# Patient Record
Sex: FEMALE | Race: WHITE | NOT HISPANIC OR LATINO | URBAN - METROPOLITAN AREA
[De-identification: names, ages, dates, MRNs, and addresses within clinical notes are randomized per-mention and may not be internally consistent; named-entity substitution may affect disease eponyms.]

---

## 2022-01-27 ENCOUNTER — INPATIENT (INPATIENT)
Facility: HOSPITAL | Age: 87
LOS: 4 days | Discharge: NURSING, REHAB OR LONG-TERM CARE | DRG: 563 | End: 2022-02-01
Attending: INTERNAL MEDICINE | Admitting: INTERNAL MEDICINE
Payer: COMMERCIAL

## 2022-01-27 VITALS
TEMPERATURE: 99 F | SYSTOLIC BLOOD PRESSURE: 107 MMHG | RESPIRATION RATE: 16 BRPM | WEIGHT: 179.9 LBS | HEART RATE: 84 BPM | DIASTOLIC BLOOD PRESSURE: 69 MMHG | OXYGEN SATURATION: 87 % | HEIGHT: 63 IN

## 2022-01-27 DIAGNOSIS — S42.302A UNSPECIFIED FRACTURE OF SHAFT OF HUMERUS, LEFT ARM, INITIAL ENCOUNTER FOR CLOSED FRACTURE: ICD-10-CM

## 2022-01-27 DIAGNOSIS — J44.9 CHRONIC OBSTRUCTIVE PULMONARY DISEASE, UNSPECIFIED: ICD-10-CM

## 2022-01-27 DIAGNOSIS — S42.309A UNSPECIFIED FRACTURE OF SHAFT OF HUMERUS, UNSPECIFIED ARM, INITIAL ENCOUNTER FOR CLOSED FRACTURE: ICD-10-CM

## 2022-01-27 DIAGNOSIS — Z29.9 ENCOUNTER FOR PROPHYLACTIC MEASURES, UNSPECIFIED: ICD-10-CM

## 2022-01-27 DIAGNOSIS — I10 ESSENTIAL (PRIMARY) HYPERTENSION: ICD-10-CM

## 2022-01-27 LAB
ALBUMIN SERPL ELPH-MCNC: 2.6 G/DL — LOW (ref 3.5–5)
ALP SERPL-CCNC: 148 U/L — HIGH (ref 40–120)
ALT FLD-CCNC: 25 U/L DA — SIGNIFICANT CHANGE UP (ref 10–60)
ANION GAP SERPL CALC-SCNC: 7 MMOL/L — SIGNIFICANT CHANGE UP (ref 5–17)
AST SERPL-CCNC: 45 U/L — HIGH (ref 10–40)
BASOPHILS # BLD AUTO: 0.05 K/UL — SIGNIFICANT CHANGE UP (ref 0–0.2)
BASOPHILS NFR BLD AUTO: 0.5 % — SIGNIFICANT CHANGE UP (ref 0–2)
BILIRUB SERPL-MCNC: 1.3 MG/DL — HIGH (ref 0.2–1.2)
BUN SERPL-MCNC: 35 MG/DL — HIGH (ref 7–18)
CALCIUM SERPL-MCNC: 8.1 MG/DL — LOW (ref 8.4–10.5)
CHLORIDE SERPL-SCNC: 102 MMOL/L — SIGNIFICANT CHANGE UP (ref 96–108)
CK SERPL-CCNC: 127 U/L — SIGNIFICANT CHANGE UP (ref 21–215)
CO2 SERPL-SCNC: 25 MMOL/L — SIGNIFICANT CHANGE UP (ref 22–31)
CREAT SERPL-MCNC: 0.82 MG/DL — SIGNIFICANT CHANGE UP (ref 0.5–1.3)
EOSINOPHIL # BLD AUTO: 0.15 K/UL — SIGNIFICANT CHANGE UP (ref 0–0.5)
EOSINOPHIL NFR BLD AUTO: 1.6 % — SIGNIFICANT CHANGE UP (ref 0–6)
GLUCOSE SERPL-MCNC: 110 MG/DL — HIGH (ref 70–99)
HCT VFR BLD CALC: 25.9 % — LOW (ref 34.5–45)
HGB BLD-MCNC: 8.2 G/DL — LOW (ref 11.5–15.5)
IMM GRANULOCYTES NFR BLD AUTO: 0.8 % — SIGNIFICANT CHANGE UP (ref 0–1.5)
LYMPHOCYTES # BLD AUTO: 1.39 K/UL — SIGNIFICANT CHANGE UP (ref 1–3.3)
LYMPHOCYTES # BLD AUTO: 15 % — SIGNIFICANT CHANGE UP (ref 13–44)
MCHC RBC-ENTMCNC: 31.4 PG — SIGNIFICANT CHANGE UP (ref 27–34)
MCHC RBC-ENTMCNC: 31.7 GM/DL — LOW (ref 32–36)
MCV RBC AUTO: 99.2 FL — SIGNIFICANT CHANGE UP (ref 80–100)
MONOCYTES # BLD AUTO: 1.16 K/UL — HIGH (ref 0–0.9)
MONOCYTES NFR BLD AUTO: 12.5 % — SIGNIFICANT CHANGE UP (ref 2–14)
NEUTROPHILS # BLD AUTO: 6.44 K/UL — SIGNIFICANT CHANGE UP (ref 1.8–7.4)
NEUTROPHILS NFR BLD AUTO: 69.6 % — SIGNIFICANT CHANGE UP (ref 43–77)
NRBC # BLD: 0 /100 WBCS — SIGNIFICANT CHANGE UP (ref 0–0)
PLATELET # BLD AUTO: 453 K/UL — HIGH (ref 150–400)
POTASSIUM SERPL-MCNC: 4.1 MMOL/L — SIGNIFICANT CHANGE UP (ref 3.5–5.3)
POTASSIUM SERPL-SCNC: 4.1 MMOL/L — SIGNIFICANT CHANGE UP (ref 3.5–5.3)
PROT SERPL-MCNC: 6.5 G/DL — SIGNIFICANT CHANGE UP (ref 6–8.3)
RBC # BLD: 2.61 M/UL — LOW (ref 3.8–5.2)
RBC # FLD: 15.9 % — HIGH (ref 10.3–14.5)
SARS-COV-2 RNA SPEC QL NAA+PROBE: SIGNIFICANT CHANGE UP
SODIUM SERPL-SCNC: 134 MMOL/L — LOW (ref 135–145)
WBC # BLD: 9.26 K/UL — SIGNIFICANT CHANGE UP (ref 3.8–10.5)
WBC # FLD AUTO: 9.26 K/UL — SIGNIFICANT CHANGE UP (ref 3.8–10.5)

## 2022-01-27 PROCEDURE — 73110 X-RAY EXAM OF WRIST: CPT | Mod: 26,LT

## 2022-01-27 PROCEDURE — 73060 X-RAY EXAM OF HUMERUS: CPT | Mod: 26,LT

## 2022-01-27 PROCEDURE — 99284 EMERGENCY DEPT VISIT MOD MDM: CPT

## 2022-01-27 RX ORDER — FERROUS SULFATE 325(65) MG
1 TABLET ORAL
Qty: 0 | Refills: 0 | DISCHARGE

## 2022-01-27 RX ORDER — LOSARTAN POTASSIUM 100 MG/1
50 TABLET, FILM COATED ORAL DAILY
Refills: 0 | Status: DISCONTINUED | OUTPATIENT
Start: 2022-01-27 | End: 2022-01-28

## 2022-01-27 RX ORDER — KETOROLAC TROMETHAMINE 30 MG/ML
30 SYRINGE (ML) INJECTION EVERY 8 HOURS
Refills: 0 | Status: DISCONTINUED | OUTPATIENT
Start: 2022-01-27 | End: 2022-01-28

## 2022-01-27 RX ORDER — LIDOCAINE 4 G/100G
1 CREAM TOPICAL DAILY
Refills: 0 | Status: DISCONTINUED | OUTPATIENT
Start: 2022-01-27 | End: 2022-02-01

## 2022-01-27 RX ORDER — PANTOPRAZOLE SODIUM 20 MG/1
40 TABLET, DELAYED RELEASE ORAL
Refills: 0 | Status: DISCONTINUED | OUTPATIENT
Start: 2022-01-27 | End: 2022-02-01

## 2022-01-27 RX ORDER — ENOXAPARIN SODIUM 100 MG/ML
40 INJECTION SUBCUTANEOUS DAILY
Refills: 0 | Status: DISCONTINUED | OUTPATIENT
Start: 2022-01-27 | End: 2022-01-28

## 2022-01-27 RX ORDER — ACETAMINOPHEN 500 MG
1 TABLET ORAL
Qty: 0 | Refills: 0 | DISCHARGE

## 2022-01-27 RX ORDER — TAMSULOSIN HYDROCHLORIDE 0.4 MG/1
0.4 CAPSULE ORAL AT BEDTIME
Refills: 0 | Status: DISCONTINUED | OUTPATIENT
Start: 2022-01-27 | End: 2022-02-01

## 2022-01-27 RX ORDER — OXYCODONE HYDROCHLORIDE 5 MG/1
1 TABLET ORAL
Qty: 0 | Refills: 0 | DISCHARGE

## 2022-01-27 RX ORDER — ACETAMINOPHEN 500 MG
650 TABLET ORAL EVERY 6 HOURS
Refills: 0 | Status: DISCONTINUED | OUTPATIENT
Start: 2022-01-27 | End: 2022-01-28

## 2022-01-27 RX ORDER — SODIUM CHLORIDE 9 MG/ML
1000 INJECTION INTRAMUSCULAR; INTRAVENOUS; SUBCUTANEOUS
Refills: 0 | Status: DISCONTINUED | OUTPATIENT
Start: 2022-01-27 | End: 2022-02-01

## 2022-01-27 RX ORDER — MORPHINE SULFATE 50 MG/1
4 CAPSULE, EXTENDED RELEASE ORAL ONCE
Refills: 0 | Status: DISCONTINUED | OUTPATIENT
Start: 2022-01-27 | End: 2022-01-27

## 2022-01-27 RX ORDER — SENNA PLUS 8.6 MG/1
2 TABLET ORAL AT BEDTIME
Refills: 0 | Status: DISCONTINUED | OUTPATIENT
Start: 2022-01-27 | End: 2022-02-01

## 2022-01-27 RX ORDER — ENOXAPARIN SODIUM 100 MG/ML
0 INJECTION SUBCUTANEOUS
Qty: 0 | Refills: 0 | DISCHARGE

## 2022-01-27 RX ORDER — GABAPENTIN 400 MG/1
100 CAPSULE ORAL THREE TIMES A DAY
Refills: 0 | Status: DISCONTINUED | OUTPATIENT
Start: 2022-01-27 | End: 2022-01-31

## 2022-01-27 RX ORDER — OXYCODONE HYDROCHLORIDE 5 MG/1
5 TABLET ORAL EVERY 6 HOURS
Refills: 0 | Status: DISCONTINUED | OUTPATIENT
Start: 2022-01-27 | End: 2022-01-29

## 2022-01-27 RX ADMIN — MORPHINE SULFATE 4 MILLIGRAM(S): 50 CAPSULE, EXTENDED RELEASE ORAL at 16:10

## 2022-01-27 RX ADMIN — MORPHINE SULFATE 4 MILLIGRAM(S): 50 CAPSULE, EXTENDED RELEASE ORAL at 19:00

## 2022-01-27 RX ADMIN — Medication 30 MILLIGRAM(S): at 20:22

## 2022-01-27 RX ADMIN — SODIUM CHLORIDE 75 MILLILITER(S): 9 INJECTION INTRAMUSCULAR; INTRAVENOUS; SUBCUTANEOUS at 20:16

## 2022-01-27 RX ADMIN — Medication 30 MILLIGRAM(S): at 20:00

## 2022-01-27 RX ADMIN — MORPHINE SULFATE 4 MILLIGRAM(S): 50 CAPSULE, EXTENDED RELEASE ORAL at 18:42

## 2022-01-27 RX ADMIN — MORPHINE SULFATE 4 MILLIGRAM(S): 50 CAPSULE, EXTENDED RELEASE ORAL at 18:56

## 2022-01-27 NOTE — H&P ADULT - PROBLEM SELECTOR PLAN 2
pmh of htn, bp soft in ED  will continue losartan at half dose (50), holding hctz  monitor bp  adjust as needed

## 2022-01-27 NOTE — ED ADULT NURSE REASSESSMENT NOTE - NS ED NURSE REASSESS COMMENT FT1
Patient is alert and oriented x 4. Resting in bed. Pain to left arm; pain score 4/10. Patient awaiting bed. Safety maintained.

## 2022-01-27 NOTE — ED ADULT NURSE NOTE - NSICDXPASTMEDICALHX_GEN_ALL_CORE_FT
PAST MEDICAL HISTORY:  Anxiety     Arthritis     COPD (chronic obstructive pulmonary disease) on home O2    Hypertension

## 2022-01-27 NOTE — ED PROVIDER NOTE - PROGRESS NOTE DETAILS
Chapman: pain tolerable when not moved. xr shows fracture at multi segment, displaced, significant swelling. pt vascularly intact, compartment remains soft but still can't move digits. ortho rec conservative management  admit to med for pain controll

## 2022-01-27 NOTE — ED PROVIDER NOTE - OBJECTIVE STATEMENT
89 yr old female with hx of COPD, left shaft humeral fx on simpson splint, HTN, and left ORIF presents to ed fro, QBEC presents to ed c/o severe worsening pain of left humerus. pt was dc from [facility] for a left humeral fx. prior recod per ortho shows able to move digit.

## 2022-01-27 NOTE — H&P ADULT - ASSESSMENT
Patient is a 89 y/o F with PMH of HTN, CODP, who presented from NH due to worsening pain and difficulty moving fingers of left arm. Admitted for possible hematoma, worsening fracture, ortho eval.

## 2022-01-27 NOTE — ED ADULT NURSE NOTE - CHIEF COMPLAINT QUOTE
BIBA from [facility] extended care, with left arm swelling s/p surgery of l humerus 4 days ago

## 2022-01-27 NOTE — ED PROVIDER NOTE - CLINICAL SUMMARY MEDICAL DECISION MAKING FREE TEXT BOX
89 yr old female with hx of COPD, left shaft humeral fx on simpson splint, HTN, and left ORIF presents to ed fro, QBEC presents to ed c/o severe worsening pain of left humerus. pt was dc from [facility] for a left humeral fx. prior record per ortho shows able to move digit.    will contact ortho and obtain xr stat. concern for brachial plexus injury, compartment are soft, doubtful that this is embolism induced pain. labs, morphine, admit maintain simpson splint/brace

## 2022-01-27 NOTE — H&P ADULT - NSHPPHYSICALEXAM_GEN_ALL_CORE
GENERAL: distress due to pain   HEAD:  Atraumatic, Normocephalic  EYES: EOMI, PERRLA, conjunctiva and sclera clear  ENMT: No tonsillar erythema, exudates, or enlargement; Moist mucous membranes, Good dentition, No lesions  NECK: Supple, normal appearance, No JVD; Normal thyroid; Trachea midline  NERVOUS SYSTEM:  Alert & Oriented X3,  Motor Strength 5/5 B/L lower extremities, motor 5/5 on upper right arm, 2/5 on left arm due to pain, and fracture. sensation intact  CHEST/LUNG: Lungs clear to auscultation bilaterally, No rales, rhonchi, wheezing   HEART: Regular rate and rhythm; No murmurs, rubs, or gallops  ABDOMEN: Soft, Nontender, Nondistended; Bowel sounds present  EXTREMITIES:  2+ Peripheral Pulses, +2 edema on left arm up to wrist, there is tenderness to palpation of upper L arm, there is deformation at the wrist joint, with tenderness to palpation, pt able to move wrist joint and fingers w/o difficulty  LYMPH: No lymphadenopathy noted  SKIN: No rashes or lesions;  Good capillary refill

## 2022-01-27 NOTE — H&P ADULT - NSHPREVIEWOFSYSTEMS_GEN_ALL_CORE
CONSTITUTIONAL: No fever, weight loss, or fatigue  EYES: No eye pain, visual disturbances, or discharge  ENT:  No difficulty hearing, tinnitus, vertigo; No sinus or throat pain  NECK: No pain or stiffness  RESPIRATORY: No cough, wheezing, chills or hemoptysis; No Shortness of Breath  CARDIOVASCULAR: No chest pain, palpitations, passing out, dizziness, or leg swelling  GASTROINTESTINAL: No abdominal or epigastric pain. No nausea, vomiting, or hematemesis; No diarrhea or constipation. No melena or hematochezia.  GENITOURINARY: No dysuria, frequency, hematuria, or incontinence  NEUROLOGICAL: No headaches, memory loss, loss of strength, numbness, or tremors  SKIN: No itching, burning, rashes, or lesions   LYMPH Nodes: No enlarged glands  ENDOCRINE: No heat or cold intolerance; No hair loss  MUSCULOSKELETAL: Severe pain at left arm, with edema and difficulty with movement.   PSYCHIATRIC: No depression, anxiety, mood swings, or difficulty sleeping  HEME/LYMPH: No easy bruising, or bleeding gums  ALLERGY AND IMMUNOLOGIC: No hives or eczema

## 2022-01-27 NOTE — H&P ADULT - HISTORY OF PRESENT ILLNESS
Patient is a 89 y/o F with PMH of HTN, CODP, who presented from NH due to worsening pain and difficulty moving fingers of left arm. Patient had a fall 2 weeks ago where she sustained a fracture of the left humerus. She did not receive any surgical interventions at that time, she was treated conservatively in the hospital for 2 weeks and was discharge don 1/24/22 to NH. At the nursing home patient developed worsening pain and was having difficulty moving her fingers. She was evaluated by a vascular NP who recommended admission to hospital for further eval. Patient complains of severe pain, specially when touching or moving. On my exam, vasculature, motor and sensation was intact at the distal aspect. She is unable to move at the elbow joint, or shoulder joint due to severe pain. The left arm is swollen and edematous.     Xrays in ED showing Left humeral fracture in 3 pieces, left wrist xray with possible ulnar dislocation?, Official reads pending. Ortho evaluated patient in ed, recommended, outpatient follow up.

## 2022-01-27 NOTE — H&P ADULT - PROBLEM SELECTOR PLAN 1
pt s/p fall 2 weeks ago with left humeral fracture, no surgery done at [facility]  Pt now with worsening pain, swelling, and difficulty moving fingers at NH  sensation and finger movement intact on exam, vasculature intact  worsening edema and pain, with drop in Hgb concern for developing hematoma  f/u oficial xray reads  f/u CT with IV contrast  pain meds prn  Ortho consulted  Pain management consulted

## 2022-01-27 NOTE — ED ADULT NURSE NOTE - NSIMPLEMENTINTERV_GEN_ALL_ED
Implemented All Fall with Harm Risk Interventions:  [place] to call system. Call bell, personal items and telephone within reach. Instruct patient to call for assistance. Room bathroom lighting operational. Non-slip footwear when patient is off stretcher. Physically safe environment: no spills, clutter or unnecessary equipment. Stretcher in lowest position, wheels locked, appropriate side rails in place. Provide visual cue, wrist band, yellow gown, etc. Monitor gait and stability. Monitor for mental status changes and reorient to person, place, and time. Review medications for side effects contributing to fall risk. Reinforce activity limits and safety measures with patient and family. Provide visual clues: red socks.

## 2022-01-27 NOTE — ED PROVIDER NOTE - MUSCULOSKELETAL, MLM
LUE- severe swelling and hematoma of left humerus with soft compartments. flaccid extremity, radial pulse intact. unable to move digit.

## 2022-01-27 NOTE — ED ADULT TRIAGE NOTE - MEANS OF ARRIVAL
Telephone Encounter by Shweta Wright RN at 10/26/18 10:14 AM     Author:  Shweta Wright RN Service:  (none) Author Type:  Registered Nurse     Filed:  10/26/18 10:15 AM Encounter Date:  10/26/2018 Status:  Signed     :  Shweta Wright RN (Registered Nurse)            Labs released to [employer][LM1.1M]    Patient notified via [employer] message.[LM1.1T]        Revision History        User Key Date/Time User Provider Type Action    > LM1.1 10/26/18 10:15 AM Shweta Wright RN Registered Nurse Sign    M - Manual, T - Template             stretcher

## 2022-01-27 NOTE — ED ADULT TRIAGE NOTE - CHIEF COMPLAINT QUOTE
BIBA from [facility] extended care, with left arm swelling s/p surgery of l humerus 4 days ago

## 2022-01-27 NOTE — ED PROVIDER NOTE - CONSTITUTIONAL, MLM
normal... Well appearing, awake, alert, oriented to person, place, time/situation and in moderate apparent distress.

## 2022-01-28 DIAGNOSIS — Z02.9 ENCOUNTER FOR ADMINISTRATIVE EXAMINATIONS, UNSPECIFIED: ICD-10-CM

## 2022-01-28 DIAGNOSIS — D64.9 ANEMIA, UNSPECIFIED: ICD-10-CM

## 2022-01-28 DIAGNOSIS — Z71.89 OTHER SPECIFIED COUNSELING: ICD-10-CM

## 2022-01-28 LAB
ABO RH CONFIRMATION: SIGNIFICANT CHANGE UP
ALBUMIN SERPL ELPH-MCNC: 2 G/DL — LOW (ref 3.5–5)
ALP SERPL-CCNC: 120 U/L — SIGNIFICANT CHANGE UP (ref 40–120)
ALT FLD-CCNC: 20 U/L DA — SIGNIFICANT CHANGE UP (ref 10–60)
ANION GAP SERPL CALC-SCNC: 5 MMOL/L — SIGNIFICANT CHANGE UP (ref 5–17)
ANISOCYTOSIS BLD QL: SLIGHT — SIGNIFICANT CHANGE UP
AST SERPL-CCNC: 30 U/L — SIGNIFICANT CHANGE UP (ref 10–40)
BASOPHILS # BLD AUTO: 0.05 K/UL — SIGNIFICANT CHANGE UP (ref 0–0.2)
BASOPHILS NFR BLD AUTO: 0.6 % — SIGNIFICANT CHANGE UP (ref 0–2)
BILIRUB SERPL-MCNC: 1.2 MG/DL — SIGNIFICANT CHANGE UP (ref 0.2–1.2)
BLD GP AB SCN SERPL QL: SIGNIFICANT CHANGE UP
BUN SERPL-MCNC: 28 MG/DL — HIGH (ref 7–18)
CALCIUM SERPL-MCNC: 8.1 MG/DL — LOW (ref 8.4–10.5)
CHLORIDE SERPL-SCNC: 107 MMOL/L — SIGNIFICANT CHANGE UP (ref 96–108)
CO2 SERPL-SCNC: 25 MMOL/L — SIGNIFICANT CHANGE UP (ref 22–31)
CREAT SERPL-MCNC: 0.67 MG/DL — SIGNIFICANT CHANGE UP (ref 0.5–1.3)
EOSINOPHIL # BLD AUTO: 0.18 K/UL — SIGNIFICANT CHANGE UP (ref 0–0.5)
EOSINOPHIL NFR BLD AUTO: 2 % — SIGNIFICANT CHANGE UP (ref 0–6)
GLUCOSE SERPL-MCNC: 102 MG/DL — HIGH (ref 70–99)
HCT VFR BLD CALC: 20.7 % — CRITICAL LOW (ref 34.5–45)
HCT VFR BLD CALC: 24.2 % — LOW (ref 34.5–45)
HGB BLD-MCNC: 6.7 G/DL — CRITICAL LOW (ref 11.5–15.5)
HGB BLD-MCNC: 7.6 G/DL — LOW (ref 11.5–15.5)
HYPOCHROMIA BLD QL: SLIGHT — SIGNIFICANT CHANGE UP
IMM GRANULOCYTES NFR BLD AUTO: 0.6 % — SIGNIFICANT CHANGE UP (ref 0–1.5)
LYMPHOCYTES # BLD AUTO: 1.72 K/UL — SIGNIFICANT CHANGE UP (ref 1–3.3)
LYMPHOCYTES # BLD AUTO: 19.4 % — SIGNIFICANT CHANGE UP (ref 13–44)
MACROCYTES BLD QL: SLIGHT — SIGNIFICANT CHANGE UP
MAGNESIUM SERPL-MCNC: 2 MG/DL — SIGNIFICANT CHANGE UP (ref 1.6–2.6)
MANUAL SMEAR VERIFICATION: SIGNIFICANT CHANGE UP
MCHC RBC-ENTMCNC: 31.4 GM/DL — LOW (ref 32–36)
MCHC RBC-ENTMCNC: 31.7 PG — SIGNIFICANT CHANGE UP (ref 27–34)
MCHC RBC-ENTMCNC: 32.4 GM/DL — SIGNIFICANT CHANGE UP (ref 32–36)
MCHC RBC-ENTMCNC: 32.4 PG — SIGNIFICANT CHANGE UP (ref 27–34)
MCV RBC AUTO: 100 FL — SIGNIFICANT CHANGE UP (ref 80–100)
MCV RBC AUTO: 100.8 FL — HIGH (ref 80–100)
MONOCYTES # BLD AUTO: 0.93 K/UL — HIGH (ref 0–0.9)
MONOCYTES NFR BLD AUTO: 10.5 % — SIGNIFICANT CHANGE UP (ref 2–14)
NEUTROPHILS # BLD AUTO: 5.92 K/UL — SIGNIFICANT CHANGE UP (ref 1.8–7.4)
NEUTROPHILS NFR BLD AUTO: 66.9 % — SIGNIFICANT CHANGE UP (ref 43–77)
NRBC # BLD: 0 /100 WBCS — SIGNIFICANT CHANGE UP (ref 0–0)
NRBC # BLD: 0 /100 WBCS — SIGNIFICANT CHANGE UP (ref 0–0)
PHOSPHATE SERPL-MCNC: 3.1 MG/DL — SIGNIFICANT CHANGE UP (ref 2.5–4.5)
PLAT MORPH BLD: NORMAL — SIGNIFICANT CHANGE UP
PLATELET # BLD AUTO: 358 K/UL — SIGNIFICANT CHANGE UP (ref 150–400)
PLATELET # BLD AUTO: 419 K/UL — HIGH (ref 150–400)
POIKILOCYTOSIS BLD QL AUTO: SLIGHT — SIGNIFICANT CHANGE UP
POLYCHROMASIA BLD QL SMEAR: SLIGHT — SIGNIFICANT CHANGE UP
POTASSIUM SERPL-MCNC: 3.7 MMOL/L — SIGNIFICANT CHANGE UP (ref 3.5–5.3)
POTASSIUM SERPL-SCNC: 3.7 MMOL/L — SIGNIFICANT CHANGE UP (ref 3.5–5.3)
PROT SERPL-MCNC: 5.5 G/DL — LOW (ref 6–8.3)
RBC # BLD: 2.07 M/UL — LOW (ref 3.8–5.2)
RBC # BLD: 2.4 M/UL — LOW (ref 3.8–5.2)
RBC # FLD: 15.7 % — HIGH (ref 10.3–14.5)
RBC # FLD: 15.9 % — HIGH (ref 10.3–14.5)
RBC BLD AUTO: ABNORMAL
SMUDGE CELLS # BLD: PRESENT — SIGNIFICANT CHANGE UP
SODIUM SERPL-SCNC: 137 MMOL/L — SIGNIFICANT CHANGE UP (ref 135–145)
WBC # BLD: 10.47 K/UL — SIGNIFICANT CHANGE UP (ref 3.8–10.5)
WBC # BLD: 8.85 K/UL — SIGNIFICANT CHANGE UP (ref 3.8–10.5)
WBC # FLD AUTO: 10.47 K/UL — SIGNIFICANT CHANGE UP (ref 3.8–10.5)
WBC # FLD AUTO: 8.85 K/UL — SIGNIFICANT CHANGE UP (ref 3.8–10.5)

## 2022-01-28 PROCEDURE — 73201 CT UPPER EXTREMITY W/DYE: CPT | Mod: 26,LT

## 2022-01-28 PROCEDURE — 99222 1ST HOSP IP/OBS MODERATE 55: CPT

## 2022-01-28 RX ORDER — ALBUTEROL 90 UG/1
2 AEROSOL, METERED ORAL EVERY 6 HOURS
Refills: 0 | Status: DISCONTINUED | OUTPATIENT
Start: 2022-01-28 | End: 2022-02-01

## 2022-01-28 RX ORDER — POLYETHYLENE GLYCOL 3350 17 G/17G
17 POWDER, FOR SOLUTION ORAL DAILY
Refills: 0 | Status: DISCONTINUED | OUTPATIENT
Start: 2022-01-28 | End: 2022-02-01

## 2022-01-28 RX ORDER — ACETAMINOPHEN 500 MG
1000 TABLET ORAL ONCE
Refills: 0 | Status: COMPLETED | OUTPATIENT
Start: 2022-01-29 | End: 2022-01-29

## 2022-01-28 RX ORDER — ACETAMINOPHEN 500 MG
1000 TABLET ORAL ONCE
Refills: 0 | Status: COMPLETED | OUTPATIENT
Start: 2022-01-28 | End: 2022-01-28

## 2022-01-28 RX ADMIN — TAMSULOSIN HYDROCHLORIDE 0.4 MILLIGRAM(S): 0.4 CAPSULE ORAL at 21:25

## 2022-01-28 RX ADMIN — Medication 1000 MILLIGRAM(S): at 21:30

## 2022-01-28 RX ADMIN — GABAPENTIN 100 MILLIGRAM(S): 400 CAPSULE ORAL at 06:24

## 2022-01-28 RX ADMIN — GABAPENTIN 100 MILLIGRAM(S): 400 CAPSULE ORAL at 00:46

## 2022-01-28 RX ADMIN — OXYCODONE HYDROCHLORIDE 5 MILLIGRAM(S): 5 TABLET ORAL at 17:27

## 2022-01-28 RX ADMIN — GABAPENTIN 100 MILLIGRAM(S): 400 CAPSULE ORAL at 21:27

## 2022-01-28 RX ADMIN — Medication 400 MILLIGRAM(S): at 11:29

## 2022-01-28 RX ADMIN — OXYCODONE HYDROCHLORIDE 5 MILLIGRAM(S): 5 TABLET ORAL at 09:40

## 2022-01-28 RX ADMIN — SENNA PLUS 2 TABLET(S): 8.6 TABLET ORAL at 21:25

## 2022-01-28 RX ADMIN — LIDOCAINE 1 PATCH: 4 CREAM TOPICAL at 19:40

## 2022-01-28 RX ADMIN — PANTOPRAZOLE SODIUM 40 MILLIGRAM(S): 20 TABLET, DELAYED RELEASE ORAL at 06:24

## 2022-01-28 RX ADMIN — ALBUTEROL 2 PUFF(S): 90 AEROSOL, METERED ORAL at 11:43

## 2022-01-28 RX ADMIN — SENNA PLUS 2 TABLET(S): 8.6 TABLET ORAL at 00:46

## 2022-01-28 RX ADMIN — GABAPENTIN 100 MILLIGRAM(S): 400 CAPSULE ORAL at 15:56

## 2022-01-28 RX ADMIN — TAMSULOSIN HYDROCHLORIDE 0.4 MILLIGRAM(S): 0.4 CAPSULE ORAL at 00:46

## 2022-01-28 RX ADMIN — Medication 1000 MILLIGRAM(S): at 12:00

## 2022-01-28 RX ADMIN — OXYCODONE HYDROCHLORIDE 5 MILLIGRAM(S): 5 TABLET ORAL at 17:58

## 2022-01-28 RX ADMIN — LIDOCAINE 1 PATCH: 4 CREAM TOPICAL at 11:30

## 2022-01-28 RX ADMIN — OXYCODONE HYDROCHLORIDE 5 MILLIGRAM(S): 5 TABLET ORAL at 09:02

## 2022-01-28 RX ADMIN — Medication 400 MILLIGRAM(S): at 21:25

## 2022-01-28 NOTE — PROGRESS NOTE ADULT - PROBLEM SELECTOR PLAN 3
-not in exacerbation   -cont PRN albuterol   -supplemental O2 PRN -unknown baseline, no acute sign or symptoms of bleeding   -repeat CBC with slight improvement   -f/u CT LUE   -mon CBC closely   -transfuse PRN for hg <7

## 2022-01-28 NOTE — PROGRESS NOTE ADULT - SUBJECTIVE AND OBJECTIVE BOX
Patient is a 89y old  Female who presents with a chief complaint of left humeral fracture, worsening pain (27 Jan 2022 20:19)    OVERNIGHT EVENTS: AM CBC results noted, cbc repeated with improvement       REVIEW OF SYSTEMS:  RESPIRATORY: No cough, SOB  CARDIOVASCULAR: No chest pain, palpitations  GASTROINTESTINAL: No abdominal pain  NEUROLOGICAL: No HA  MUSCULOSKELETAL: +ve left arm pain and hand pain       T(C): 37 (01-28-22 @ 05:24), Max: 37.3 (01-27-22 @ 15:39)  HR: 69 (01-28-22 @ 05:24) (69 - 87)  BP: 93/45 (01-28-22 @ 05:24) (93/45 - 121/68)  RR: 16 (01-28-22 @ 05:24) (16 - 18)  SpO2: 99% (01-28-22 @ 05:24) (87% - 99%)  Wt(kg): --Vital Signs Last 24 Hrs  T(C): 37 (28 Jan 2022 05:24), Max: 37.3 (27 Jan 2022 15:39)  T(F): 98.6 (28 Jan 2022 05:24), Max: 99.2 (27 Jan 2022 20:30)  HR: 69 (28 Jan 2022 05:24) (69 - 87)  BP: 93/45 (28 Jan 2022 05:24) (93/45 - 121/68)  BP(mean): 57 (28 Jan 2022 05:24) (57 - 57)  RR: 16 (28 Jan 2022 05:24) (16 - 18)  SpO2: 99% (28 Jan 2022 05:24) (87% - 99%)    MEDICATIONS  (STANDING):  bisacodyl Suppository 10 milliGRAM(s) Rectal daily  enoxaparin Injectable 40 milliGRAM(s) SubCutaneous daily  gabapentin 100 milliGRAM(s) Oral three times a day  lidocaine   4% Patch 1 Patch Transdermal daily  losartan 50 milliGRAM(s) Oral daily  pantoprazole    Tablet 40 milliGRAM(s) Oral before breakfast  senna 2 Tablet(s) Oral at bedtime  sodium chloride 0.9%. 1000 milliLiter(s) (75 mL/Hr) IV Continuous <Continuous>  tamsulosin 0.4 milliGRAM(s) Oral at bedtime    MEDICATIONS  (PRN):  acetaminophen     Tablet .. 650 milliGRAM(s) Oral every 6 hours PRN Mild Pain (1 - 3)  oxyCODONE    IR 5 milliGRAM(s) Oral every 6 hours PRN Severe Pain (7 - 10)      PHYSICAL EXAM:  GENERAL: NAD  EYES: clear conjunctiva  ENMT: Moist mucous membranes  NECK: Supple, No JVD  CHEST/LUNG: Clear to auscultation bilaterally; No rales, rhonchi, wheezing, or rubs  HEART: S1, S2, Regular rate and rhythm  ABDOMEN: Soft, Nontender, Nondistended; Bowel sounds present  NEURO: Alert & Oriented X3,   EXTREMITIES: 2+edema left arm, +ve wrist tenderness, +ve left radial pulse, intact sensation 2/5 LUE strength, b/l LE with 2+ edema, left upper arm with dsg and brace    Consultant(s) Notes Reviewed:  [x ] YES  [ ] NO  Care Discussed with Consultants/Other Providers [ x] YES  [ ] NO    LABS:                        7.6    10.47 )-----------( 419      ( 28 Jan 2022 08:33 )             24.2     01-28    137  |  107  |  28<H>  ----------------------------<  102<H>  3.7   |  25  |  0.67    Ca    8.1<L>      28 Jan 2022 06:54  Phos  3.1     01-28  Mg     2.0     01-28    TPro  5.5<L>  /  Alb  2.0<L>  /  TBili  1.2  /  DBili  x   /  AST  30  /  ALT  20  /  AlkPhos  120  01-28    CAPILLARY BLOOD GLUCOSE  RADIOLOGY & ADDITIONAL TESTS:    < from: Xray Wrist 3 Views, Left (01.27.22 @ 19:11) >    ACC: [id number] EXAM:  XR WRIST COMP MIN 3 VIEWS LT                          PROCEDURE DATE:  01/27/2022          INTERPRETATION:  Clinical history: 89-year-old female, fracture.    Three views of the left wrist demonstrates a distal radial fracture,  chronic in the absence of focal clinical findings. Marked positive ulnar   variance is noted.    IMPRESSION:  Degenerative change with no acute fracture, recommend correlation with   physical findings at the distal radius however    < end of copied text >      Imaging Personally Reviewed:  [ ] YES  [ ] NO

## 2022-01-28 NOTE — PROGRESS NOTE ADULT - PROBLEM SELECTOR PLAN 5
-discharge disposition likely back to [facility] pending clinical course and improvement, pending CT LUE  -Spoke to pt's daughter Abbey this AM, updated on clinical status, treatment plan/options, all questions answered -dvt ppx: Lovenox  -gi ppx: PPI -dvt ppx: hold chemo for worsening anemia, restart when bleeding is ruled out, SCDs   -gi ppx: PPI

## 2022-01-28 NOTE — CONSULT NOTE ADULT - SUBJECTIVE AND OBJECTIVE BOX
SADE BROCK  [id number]    Orthopedic Consult:    Orthopedic Diagnosis:      SADE [id number]  HPI:  Patient is a 89 y/o F, RHD, lives at home, with a PMHx of COPD and HTN who presents today sent in from [facility] for worsening left arm pain/swelling. Patients daughter is at bedside providing the history. She states that patient has been wheel chair bound after a "hip surgery" for the past 4 years with transfers. On 01/11/22 she states the patient was transferring from wheelchair to the toilet when the chair slipped out causing her to fall onto left side. Patient was given pain medication and warm & cold compresses were placed on the arm with minimal relief. Patient then had a subsequent fall onto the left side again the day after, where an ambulance was called and she was seen at [facility] and diagnosed with a left humeral shaft fracture. Patient was treated conservatively with a coaptation splint and was transitioned to a Simpson brace and was being followed by Dr. Andrew Chaudhari. Pt denies Chest pain, SOB, dyspnea,  N/V/D, abdominal pain, syncope, or pain anywhere else.       Ambulation: Wheelchair with transfers     PAST MEDICAL & SURGICAL HISTORY:  Hypertension    Anxiety    Arthritis    COPD (chronic obstructive pulmonary disease)  on home O2        FAMILY HISTORY:      Social History:      Allergies    No Known Allergies    Intolerances        Home Medications:  ALPRAZolam: 0.5 milligram(s) orally once a day (30 Nov 2017 15:49)  hydrochlorothiazide-losartan 25 mg-100 mg oral tablet: 1 tab(s) orally once a day (30 Nov 2017 15:49)  traMADol: 50 milligram(s) orally every 8 hours, As Needed (30 Nov 2017 15:49)      Vital Signs Last 24 Hrs  T(C): 37.3 (27 Jan 2022 15:39), Max: 37.3 (27 Jan 2022 15:39)  T(F): 99.1 (27 Jan 2022 15:39), Max: 99.1 (27 Jan 2022 15:39)  HR: 84 (27 Jan 2022 15:39) (84 - 84)  BP: 107/69 (27 Jan 2022 15:39) (107/69 - 107/69)  BP(mean): --  RR: 16 (27 Jan 2022 15:39) (16 - 16)  SpO2: 87% (27 Jan 2022 15:39) (87% - 87%)  I&O's Summary      Physical Exam:  General: Alert, patient appears uncomfortable and in pain  Musculoskeletal:  Left UE: Simpson brace intact. upper arm is ecchymotic and swollen. No open wounds. No skin tenting. Fracture deformity palpated. Diffuse ttp noted on upper arm. Axillary nerve sensation intact. Patient able to actively extend wrist and flex/extend thumb. AIN/Radial nerve appear intact. Patient unable to actively flex wrist or range digits 2-5 however sensation is grossly intact. Swelling noted to left hand.      Labs:                        8.2    9.26  )-----------( 453      ( 27 Jan 2022 16:24 )             25.9     01-27    134<L>  |  102  |  35<H>  ----------------------------<  110<H>  4.1   |  25  |  0.82    Ca    8.1<L>      27 Jan 2022 16:24    TPro  6.5  /  Alb  2.6<L>  /  TBili  1.3<H>  /  DBili  x   /  AST  45<H>  /  ALT  25  /  AlkPhos  148<H>  01-27        Radiology:      Impression: Patient is a 89yFemale with Left midshaft humerus fracture   Plan:  - Pain management  - DVT prophylaxis  - NWB to LUE with arm in simpson brace   - wrist xrays ordered  - Case discussed with Dr. Elizalde who recommends patient to follow up with her primary orthopedist Dr. Andrew Chaudhari  - Case d/w Dr. Elizalde 
Source of information: , Chart review, patient  Patient language: English  : n/a    CC: Patient is a 89y old  Female who presents with a chief complaint of left humeral fracture, worsening pain (28 Jan 2022 09:43)      HPI:  Patient is a 89 y/o F with PMH of HTN, CODP, who presented from NH due to worsening pain and difficulty moving fingers of left arm. Patient had a fall 2 weeks ago where she sustained a fracture of the left humerus. She did not receive any surgical interventions at that time, she was treated conservatively in the hospital for 2 weeks and was discharge don 1/24/22 to NH. At the nursing home patient developed worsening pain and was having difficulty moving her fingers. She was evaluated by a vascular NP who recommended admission to hospital for further eval. Patient complains of severe pain, specially when touching or moving. On my exam, vasculature, motor and sensation was intact at the distal aspect. She is unable to move at the elbow joint, or shoulder joint due to severe pain. The left arm is swollen and edematous.     Xrays in ED showing Left humeral fracture in 3 pieces, left wrist xray with possible ulnar dislocation?, Official reads pending. Ortho evaluated patient in ed, recommended, outpatient follow up. (27 Jan 2022 20:19)      Pt s/p fall + left humerus fracture.  + left arm pain. + left wrist pain with healed radial fracture.   left hand edema and ecchymosis. + left arm in Martínez brace.  No nausea or vomiting.  h/h low.  Pt is awake and alert.  No surgical intervention at this time.      PAIN SCORE:    4/10  SCALE USED: (1-10 VNRS)    PAST MEDICAL & SURGICAL HISTORY:  Hypertension    Anxiety    Arthritis    COPD (chronic obstructive pulmonary disease)  on home O2        FAMILY HISTORY:        SOCIAL HISTORY:  [x ] Denies Smoking, Alcohol, or Drug Use    Allergies    No Known Allergies    Intolerances        MEDICATIONS:    MEDICATIONS  (STANDING):  acetaminophen   IVPB .. 1000 milliGRAM(s) IV Intermittent once  bisacodyl Suppository 10 milliGRAM(s) Rectal daily  gabapentin 100 milliGRAM(s) Oral three times a day  lidocaine   4% Patch 1 Patch Transdermal daily  pantoprazole    Tablet 40 milliGRAM(s) Oral before breakfast  polyethylene glycol 3350 17 Gram(s) Oral daily  senna 2 Tablet(s) Oral at bedtime  sodium chloride 0.9%. 1000 milliLiter(s) (75 mL/Hr) IV Continuous <Continuous>  tamsulosin 0.4 milliGRAM(s) Oral at bedtime    MEDICATIONS  (PRN):  ALBUTerol    90 MICROgram(s) HFA Inhaler 2 Puff(s) Inhalation every 6 hours PRN Shortness of Breath and/or Wheezing  oxyCODONE    IR 5 milliGRAM(s) Oral every 6 hours PRN Severe Pain (7 - 10)      Vital Signs Last 24 Hrs  T(C): 36.3 (28 Jan 2022 13:09), Max: 37.3 (27 Jan 2022 15:39)  T(F): 97.4 (28 Jan 2022 13:09), Max: 99.2 (27 Jan 2022 20:30)  HR: 69 (28 Jan 2022 13:09) (69 - 87)  BP: 102/61 (28 Jan 2022 13:09) (93/45 - 121/68)  BP(mean): 57 (28 Jan 2022 05:24) (57 - 57)  RR: 17 (28 Jan 2022 13:09) (16 - 18)  SpO2: 97% (28 Jan 2022 13:09) (87% - 99%)    LABS:                          7.6    10.47 )-----------( 419      ( 28 Jan 2022 08:33 )             24.2     01-28    137  |  107  |  28<H>  ----------------------------<  102<H>  3.7   |  25  |  0.67    Ca    8.1<L>      28 Jan 2022 06:54  Phos  3.1     01-28  Mg     2.0     01-28    TPro  5.5<L>  /  Alb  2.0<L>  /  TBili  1.2  /  DBili  x   /  AST  30  /  ALT  20  /  AlkPhos  120  01-28      LIVER FUNCTIONS - ( 28 Jan 2022 06:54 )  Alb: 2.0 g/dL / Pro: 5.5 g/dL / ALK PHOS: 120 U/L / ALT: 20 U/L DA / AST: 30 U/L / GGT: x             CAPILLARY BLOOD GLUCOSE          REVIEW OF SYSTEMS:  CONSTITUTIONAL: No fever or fatigue  RESPIRATORY: No cough, wheezing, chills or hemoptysis; No shortness of breath  CARDIOVASCULAR: No chest pain, palpitations, dizziness, or leg swelling  GASTROINTESTINAL: No abdominal or epigastric pain. No nausea, vomiting; No diarrhea or constipation.   GENITOURINARY: No dysuria, frequency, hematuria, retention or incontinence  MUSCULOSKELETAL: + left arm pain  + left wrist pain  NEURO: No weakness, no numbness   PSYCHIATRIC: No depression, anxiety, mood swings, or difficulty sleeping    PHYSICAL EXAM:  GENERAL:  Alert & Oriented X3, NAD, Good concentration  CHEST/LUNG: Clear to auscultation bilaterally; No rales, rhonchi, wheezing, or rubs  HEART: Regular rate and rhythm; No murmurs, rubs, or gallops  ABDOMEN: Soft, Nontender, Nondistended; Bowel sounds present  : no incontinence, no flank pain  EXTREMITIES:  2+ Peripheral Pulses, No cyanosis, or edema  MUSCULOSKELETAL: + left wrist tenderness + left edema + left arm tenderness - brace   NEUROLOGICAL: awake and alert and oriented   SKIN: No rashes or lesions    Radiology:  < from: CT Upper Extremity w/ IV Cont, Left (01.28.22 @ 10:44) >  ACC: [id number] EXAM:  CT UPR EXT IC LT                          PROCEDURE DATE:  01/28/2022          INTERPRETATION:  CT UPPER EXTREMITY WITH IV CONTRAST LEFT    HISTORY: left humeral fracture, possible hematoma, possible left wrist   dislocation.. Pain. Fracture 2 weeks ago.    TECHNIQUE: Contiguous axial imaging was performed through the left upper   extremity from the shoulder to the hand with 90 cc Omnipaque 350   intravenous contrast. Coronal and sagittal reformatting was utilized.    COMPARISON:  Left humerus and left wrist x-rays dated January 27, 2022.    FINDINGS:    OSSEOUS STRUCTURES    Fractures:  Severely displaced fracture of the mid to proximal 3rd of   the humeral diaphysis is again seen with a large lateral cortical   butterfly fragment. There is approximately 4.5 cm of lateral displacement   of the distal diaphysis and butterfly fragment. Large surrounding   hematoma is present. No vascular blush is seen.  There is healed fracture deformity of the distal radial diametaphysis   with impaction and resultant ulnar positive variance.    Postoperative Changes: Left hip gamma nail is partially imaged with   healed fracture deformity.    LEFT SHOULDER JOINTS    Joint Space(s):  Mild glenohumeral joint space narrowing is noted with   tiny osteophytes.    LEFT ELBOW JOINTS    Joint Space(s):  There is mild subchondral flattening of the trochlea   with broadening of the trochlear notch. Subchondral mottling of the   radial capitellar joint is also noted with flattening. There are   moderate-sized osteophytes.    Effusion:  Small effusion appears to be present.    LEFT WRIST/HAND JOINTS    Joint Space(s):  Otherwise maintained    MYOTENDINOUS STRUCTURES  Large hematoma surrounds the humeral fracture site.  Mild to moderate generalized muscle atrophy is present.    NEUROVASCULAR STRUCTURES  Mild atherosclerotic calcifications are present. No vascular blush is   appreciated.  Coronary calcifications are noted.    OTHER SOFT TISSUES  Mild to moderate subcutaneous edema is present.  Presumed dystrophic splenic calcifications are present.    IMAGED LUNGS  Presumed dependent atelectasis is noted.    IMPRESSION:  1. Severely displaced and comminuted fracture of the mid to proximal   humeral diaphysis is present with large hematoma.  2. No vascular blush is appreciated.  3. Healed fracture deformity of the distal radial diametaphysis with   impaction and ulnar positive variance.  4. Correlation with other priors is suggested to assess stability of   these findings.    < end of copied text >      Drug Screen:          ORT Score   Family Hx of substance abuse	Female	Male  Alcohol 	                                              1              3  Illegal drugs	                                      2              3  Rx drugs                                               4	      4    Personal Hx of substance abuse		  Alcohol 	                                               3	      3  Illegal drugs                                  	       4	      4  Rx drugs                                                5	      5  Age between 16- 45 years	               1             1  hx preadolescent sexual abuse	       3	      0  Psychological disease		  ADD, OCD, bipolar, schizophrenia	2	      2  Depression                                    	1	      1  Score totals 		0  		  a score of 3 or lower indicates low risk for opioid abuse		  a score of 4-7 indicates moderate risk for opioid abuse		  a score of 8 or higher indicates high risk for opioid abuse	    Risk factors associated with adverse outcomes related to opioid treatment  [ ]  Concurrent benzodiazepine use  [ ]  History/ Active substance use or alcohol use disorder  [ ] Psychiatric co-morbidity  [ ] Sleep apnea  [ ] COPD  [ ] BMI> 35  [ ] Liver dysfunction  [ ] Renal dysfunction  [ ] CHF  [ ] Smoker  [x ]  Age > 60 years      [x ]  [facility]  Reviewed and Copied to Chart. See below.

## 2022-01-28 NOTE — CONSULT NOTE ADULT - PROBLEM SELECTOR RECOMMENDATION 9
. Pt with left humeral fracture.  + left arm pain.  Pain with movement.  + left wrist pain.  + radial pain - fracture healed  High risk medications reviewed. Avoid polypharmacy. Avoid IV opioids. Avoid NSAIDs and benzodiazepines. Non-pharmacological sleep aides initiated. Non-opioid medications and non-pharmacological pain management measures initiated.  Mild pain - pain level 1-3  nonpharmacological measures  ice packs, heat packs, PT/OOB, guided imagery, distraction   Nonopioid recc  - Acetaminophen 1 gram ivpb q 6 hours ofr 4 doses   - Gabapentin 100mg po q 8 hours   - Lidocaine 4% daily   Opioid Recc  - oxycodone 5mg po q 4 hours prn   Bowel Regimen  - senna  PT   No surgical intervention. Monitor h/h.  Would transfuse 1 unit.  check iron levels.  Pt to follow up with ortho surgeon.

## 2022-01-28 NOTE — PROGRESS NOTE ADULT - PROBLEM SELECTOR PLAN 6
-pt is DNR/DNI   -MOLST in chart -discharge disposition likely back to [facility] pending clinical course and improvement, pending CT LUE  -Spoke to pt's daughter Abbey this AM, updated on clinical status, treatment plan/options, all questions answered

## 2022-01-29 LAB
ANION GAP SERPL CALC-SCNC: 6 MMOL/L — SIGNIFICANT CHANGE UP (ref 5–17)
BUN SERPL-MCNC: 34 MG/DL — HIGH (ref 7–18)
CALCIUM SERPL-MCNC: 8 MG/DL — LOW (ref 8.4–10.5)
CHLORIDE SERPL-SCNC: 106 MMOL/L — SIGNIFICANT CHANGE UP (ref 96–108)
CO2 SERPL-SCNC: 25 MMOL/L — SIGNIFICANT CHANGE UP (ref 22–31)
CREAT SERPL-MCNC: 0.63 MG/DL — SIGNIFICANT CHANGE UP (ref 0.5–1.3)
GLUCOSE SERPL-MCNC: 93 MG/DL — SIGNIFICANT CHANGE UP (ref 70–99)
HCT VFR BLD CALC: 25.7 % — LOW (ref 34.5–45)
HGB BLD-MCNC: 8.6 G/DL — LOW (ref 11.5–15.5)
MCHC RBC-ENTMCNC: 32.2 PG — SIGNIFICANT CHANGE UP (ref 27–34)
MCHC RBC-ENTMCNC: 33.5 GM/DL — SIGNIFICANT CHANGE UP (ref 32–36)
MCV RBC AUTO: 96.3 FL — SIGNIFICANT CHANGE UP (ref 80–100)
NRBC # BLD: 0 /100 WBCS — SIGNIFICANT CHANGE UP (ref 0–0)
PLATELET # BLD AUTO: 390 K/UL — SIGNIFICANT CHANGE UP (ref 150–400)
POTASSIUM SERPL-MCNC: 4 MMOL/L — SIGNIFICANT CHANGE UP (ref 3.5–5.3)
POTASSIUM SERPL-SCNC: 4 MMOL/L — SIGNIFICANT CHANGE UP (ref 3.5–5.3)
RBC # BLD: 2.67 M/UL — LOW (ref 3.8–5.2)
RBC # FLD: 17.2 % — HIGH (ref 10.3–14.5)
SODIUM SERPL-SCNC: 137 MMOL/L — SIGNIFICANT CHANGE UP (ref 135–145)
WBC # BLD: 7.26 K/UL — SIGNIFICANT CHANGE UP (ref 3.8–10.5)
WBC # FLD AUTO: 7.26 K/UL — SIGNIFICANT CHANGE UP (ref 3.8–10.5)

## 2022-01-29 RX ORDER — ACETAMINOPHEN 500 MG
650 TABLET ORAL EVERY 6 HOURS
Refills: 0 | Status: DISCONTINUED | OUTPATIENT
Start: 2022-01-29 | End: 2022-01-31

## 2022-01-29 RX ORDER — OXYCODONE AND ACETAMINOPHEN 5; 325 MG/1; MG/1
2 TABLET ORAL EVERY 6 HOURS
Refills: 0 | Status: DISCONTINUED | OUTPATIENT
Start: 2022-01-29 | End: 2022-01-31

## 2022-01-29 RX ORDER — OXYCODONE AND ACETAMINOPHEN 5; 325 MG/1; MG/1
1 TABLET ORAL EVERY 6 HOURS
Refills: 0 | Status: DISCONTINUED | OUTPATIENT
Start: 2022-01-29 | End: 2022-01-31

## 2022-01-29 RX ADMIN — PANTOPRAZOLE SODIUM 40 MILLIGRAM(S): 20 TABLET, DELAYED RELEASE ORAL at 05:23

## 2022-01-29 RX ADMIN — Medication 10 MILLIGRAM(S): at 11:21

## 2022-01-29 RX ADMIN — LIDOCAINE 1 PATCH: 4 CREAM TOPICAL at 19:33

## 2022-01-29 RX ADMIN — Medication 400 MILLIGRAM(S): at 05:23

## 2022-01-29 RX ADMIN — LIDOCAINE 1 PATCH: 4 CREAM TOPICAL at 11:18

## 2022-01-29 RX ADMIN — LIDOCAINE 1 PATCH: 4 CREAM TOPICAL at 23:54

## 2022-01-29 RX ADMIN — Medication 1000 MILLIGRAM(S): at 05:23

## 2022-01-29 RX ADMIN — SENNA PLUS 2 TABLET(S): 8.6 TABLET ORAL at 21:25

## 2022-01-29 RX ADMIN — OXYCODONE HYDROCHLORIDE 5 MILLIGRAM(S): 5 TABLET ORAL at 13:31

## 2022-01-29 RX ADMIN — GABAPENTIN 100 MILLIGRAM(S): 400 CAPSULE ORAL at 21:25

## 2022-01-29 RX ADMIN — OXYCODONE AND ACETAMINOPHEN 2 TABLET(S): 5; 325 TABLET ORAL at 19:06

## 2022-01-29 RX ADMIN — GABAPENTIN 100 MILLIGRAM(S): 400 CAPSULE ORAL at 05:23

## 2022-01-29 RX ADMIN — TAMSULOSIN HYDROCHLORIDE 0.4 MILLIGRAM(S): 0.4 CAPSULE ORAL at 21:26

## 2022-01-29 RX ADMIN — POLYETHYLENE GLYCOL 3350 17 GRAM(S): 17 POWDER, FOR SOLUTION ORAL at 11:18

## 2022-01-29 RX ADMIN — OXYCODONE HYDROCHLORIDE 5 MILLIGRAM(S): 5 TABLET ORAL at 11:22

## 2022-01-29 RX ADMIN — LIDOCAINE 1 PATCH: 4 CREAM TOPICAL at 00:30

## 2022-01-29 RX ADMIN — GABAPENTIN 100 MILLIGRAM(S): 400 CAPSULE ORAL at 16:00

## 2022-01-29 RX ADMIN — OXYCODONE AND ACETAMINOPHEN 2 TABLET(S): 5; 325 TABLET ORAL at 21:00

## 2022-01-29 NOTE — PATIENT PROFILE ADULT - FALL HARM RISK - HARM RISK INTERVENTIONS

## 2022-01-29 NOTE — PROGRESS NOTE ADULT - PROBLEM SELECTOR PLAN 6
-discharge disposition likely back to [facility] pending clinical course and improvement, pending CT LUE  -Spoke to pt's daughter Abbey this AM, updated on clinical status, treatment plan/options, all questions answered

## 2022-01-29 NOTE — PROGRESS NOTE ADULT - PROBLEM SELECTOR PLAN 3
-unknown baseline, no acute sign or symptoms of bleeding   -repeat CBC with slight improvement   -f/u CT LUE   -mon CBC closely   -transfuse PRN for hg <7

## 2022-01-29 NOTE — PROGRESS NOTE ADULT - SUBJECTIVE AND OBJECTIVE BOX
DATE OF SERVICE:  1/29/22  Patient was seen and examined on 1/29/22    .Interim events noted.Consultant notes ,Labs,Telemetry reviewed by me    PRESENTING CC: Left humeral fracture    HPI and HOSPITAL COURSE: HPI:  Patient is a 89 y/o F with PMH of HTN, CODP, who presented from NH due to worsening pain and difficulty moving fingers of left arm. Patient had a fall 2 weeks ago where she sustained a fracture of the left humerus. She did not receive any surgical interventions at that time, she was treated conservatively in the hospital for 2 weeks and was discharge don 1/24/22 to NH. At the nursing home patient developed worsening pain and was having difficulty moving her fingers. She was evaluated by a vascular NP who recommended admission to hospital for further eval. Patient complains of severe pain, specially when touching or moving. On my exam, vasculature, motor and sensation was intact at the distal aspect. She is unable to move at the elbow joint, or shoulder joint due to severe pain. The left arm is swollen and edematous.     Xrays in ED showing Left humeral fracture in 3 pieces, left wrist xray with possible ulnar dislocation?, Official reads pending. Ortho evaluated patient in ed, recommended, outpatient follow up. (27 Jan 2022 20:19)      INTERIM EVENTS:      PMH -reviewed admission note, no change since admission  Heart Failure: Acute [ ] Chronic [ ] Acute on Chronic [ ] Diastolic [ ] Systolic [ ] Combined Systolic and Diastolic[ ]  KEYANA[ ]  ATN[ ]  CKD I [ ] CKDII [ ] CKD III [ ] CKD IV [ ] CKD V [ ] ESRD[ ]  HTN[ ] CVA[ ] DM[ ] COPD[ ] COVID[ ] AF[ ]  PPM[ ] ICD[ ]    MEDICATIONS  (STANDING):  bisacodyl Suppository 10 milliGRAM(s) Rectal daily  gabapentin 100 milliGRAM(s) Oral three times a day  lidocaine   4% Patch 1 Patch Transdermal daily  pantoprazole    Tablet 40 milliGRAM(s) Oral before breakfast  polyethylene glycol 3350 17 Gram(s) Oral daily  senna 2 Tablet(s) Oral at bedtime  sodium chloride 0.9%. 1000 milliLiter(s) (75 mL/Hr) IV Continuous <Continuous>  tamsulosin 0.4 milliGRAM(s) Oral at bedtime    MEDICATIONS  (PRN):  ALBUTerol    90 MICROgram(s) HFA Inhaler 2 Puff(s) Inhalation every 6 hours PRN Shortness of Breath and/or Wheezing  oxyCODONE    IR 5 milliGRAM(s) Oral every 6 hours PRN Severe Pain (7 - 10)            REVIEW OF SYSTEMS:  Constitutional: [ ] fever, [ ]weight loss,  [ ]fatigue  Eyes: [ ] visual changes  Respiratory: [ ]shortness of breath;  [ ] cough, [ ]wheezing, [ ]chills, [ ]hemoptysis  Cardiovascular: [ ] chest pain, [ ]palpitations, [ ]dizziness,  [ ]leg swelling[ ]orthopnea[ ]PND  Gastrointestinal: [ ] abdominal pain, [ ]nausea, [ ]vomiting,  [ ]diarrhea [ ]Constipation [ ]Melena  Genitourinary: [ ] dysuria, [ ] hematuria [ ][place]  Neurologic: [ ] headaches [ ] tremors[ ]weakness [ ]Paralysis Right[ ] Left[ ]  Skin: [ ] itching, [ ]burning, [ ] rashes  Endocrine: [ ] heat or cold intolerance  Musculoskeletal: [ ] joint pain or swelling; [ ] muscle, back, or extremity pain  Psychiatric: [ ] depression, [ ]anxiety, [ ]mood swings, or [ ]difficulty sleeping  Hematologic: [ ] easy bruising, [ ] bleeding gums    [x] All remaining systems negative except as per above.   [ ]Unable to obtain.    Vital Signs Last 24 Hrs  T(C): 36.9 (29 Jan 2022 04:58), Max: 36.9 (29 Jan 2022 04:58)  T(F): 98.5 (29 Jan 2022 04:58), Max: 98.5 (29 Jan 2022 04:58)  HR: 65 (29 Jan 2022 04:58) (65 - 70)  BP: 102/50 (29 Jan 2022 04:58) (102/50 - 107/41)  BP(mean): 55 (28 Jan 2022 20:16) (55 - 55)  RR: 19 (29 Jan 2022 04:58) (17 - 19)  SpO2: 99% (29 Jan 2022 04:58) (97% - 99%)  I&O's Summary    28 Jan 2022 07:01  -  29 Jan 2022 07:00  --------------------------------------------------------  IN: 300 mL / OUT: 300 mL / NET: 0 mL        PHYSICAL EXAM:  General: No acute distress BMI-  HEENT: EOMI, PERRL  Neck: Supple, [ ] JVD  Lungs: Equal air entry bilaterally; [ ] rales [ ] wheezing [ ] rhonchi  Heart: Regular rate and rhythm; [ ] murmur   /6 [ ] systolic [ ] diastolic [ ] radiation[ ] rubs [ ]  gallops  Abdomen: Nontender, bowel sounds present  Extremities: No clubbing, cyanosis, [ ] edema [ ]Pulses  equal and intact  Nervous system:  Alert & Oriented X3, no focal deficits  Psychiatric: Normal affect  Skin: No rashes or lesions    LABS:  01-29    137  |  106  |  34<H>  ----------------------------<  93  4.0   |  25  |  0.63    Ca    8.0<L>      29 Jan 2022 04:30  Phos  3.1     01-28  Mg     2.0     01-28    TPro  5.5<L>  /  Alb  2.0<L>  /  TBili  1.2  /  DBili  x   /  AST  30  /  ALT  20  /  AlkPhos  120  01-28    Creatinine Trend: 0.63<--, 0.67<--, 0.82<--                        8.6    7.26  )-----------( 390      ( 29 Jan 2022 04:30 )             25.7         Cardiac Enzymes: CARDIAC MARKERS ( 27 Jan 2022 16:24 )  x     / x     / 127 U/L / x     / x                RADIOLOGY:    ECG [my interpretation]:    TELEMETRY:Reviewed monitor tracings-    ECHO:    STRESS TEST:    CATHETERIZATION:      IMPRESSION AND PLAN:

## 2022-01-29 NOTE — CHART NOTE - NSCHARTNOTEFT_GEN_A_CORE
< from: CT Upper Extremity w/ IV Cont, Left (01.28.22 @ 10:44) >      ACC: [id number] EXAM:  CT UPR EXT IC LT                          PROCEDURE DATE:  01/28/2022          INTERPRETATION:  CT UPPER EXTREMITY WITH IV CONTRAST LEFT    HISTORY: left humeral fracture, possible hematoma, possible left wrist   dislocation.. Pain. Fracture 2 weeks ago.    TECHNIQUE: Contiguous axial imaging was performed through the left upper   extremity from the shoulder to the hand with 90 cc Omnipaque 350   intravenous contrast. Coronal and sagittal reformatting was utilized.    COMPARISON:  Left humerus and left wrist x-rays dated January 27, 2022.    FINDINGS:    OSSEOUS STRUCTURES    Fractures:  Severely displaced fracture of the mid to proximal 3rd of   the humeral diaphysis is again seen with a large lateral cortical   butterfly fragment. There is approximately 4.5 cm of lateral displacement   of the distal diaphysis and butterfly fragment. Large surrounding   hematoma is present. No vascular blush is seen.  There is healed fracture deformity of the distal radial diametaphysis   with impaction and resultant ulnar positive variance.    Postoperative Changes: Left hip gamma nail is partially imaged with   healed fracture deformity.    LEFT SHOULDER JOINTS    Joint Space(s):  Mild glenohumeral joint space narrowing is noted with   tiny osteophytes.    LEFT ELBOW JOINTS    Joint Space(s):  There is mild subchondral flattening of the trochlea   with broadening of the trochlear notch. Subchondral mottling of the   radial capitellar joint is also noted with flattening. There are   moderate-sized osteophytes.    Effusion:  Small effusion appears to be present.    LEFT WRIST/HAND JOINTS    Joint Space(s):  Otherwise maintained    MYOTENDINOUS STRUCTURES  Large hematoma surrounds the humeral fracture site.  Mild to moderate generalized muscle atrophy is present.    NEUROVASCULAR STRUCTURES  Mild atherosclerotic calcifications are present. No vascular blush is   appreciated.  Coronary calcifications are noted.    OTHER SOFT TISSUES  Mild to moderate subcutaneous edema is present.  Presumed dystrophic splenic calcifications are present.    IMAGED LUNGS  Presumed dependent atelectasis is noted.    IMPRESSION:  1. Severely displaced and comminuted fracture of the mid to proximal   humeral diaphysis is present with large hematoma.  2. No vascular blush is appreciated.  3. Healed fracture deformity of the distal radial diametaphysis with   impaction and ulnar positive variance.  4. Correlation with other priors is suggested to assess stability of   these findings.      < end of copied text >      A/P: 89 y/o F with PMH of HTN, CODP, who presented from [facility] due to worsening pain and difficulty moving fingers of left arm.     Anemia:   -likely in setting of large hematoma and low baseline   -CT as above   -will transfuse 1 PRBC  -d/c Lovenox, SCDs for now   -Borderline BP, hold off BP meds for now, restart when feasible  -f/u CBC in AM       Above d/w Dr. House. Pt's daughter updated on CT results and pt's status, transfusion consent obtained. All questions answered
EVENT: notified by RN that pt c/o left arm pain     OBJECTIVE:  Vital Signs Last 24 Hrs  T(C): 36.5 (29 Jan 2022 13:25), Max: 36.9 (29 Jan 2022 04:58)  T(F): 97.7 (29 Jan 2022 13:25), Max: 98.5 (29 Jan 2022 04:58)  HR: 73 (29 Jan 2022 13:25) (65 - 73)  BP: 118/45 (29 Jan 2022 13:25) (102/50 - 138/53)  BP(mean): 62 (29 Jan 2022 13:25) (55 - 62)  RR: 18 (29 Jan 2022 13:25) (18 - 19)  SpO2: 100% (29 Jan 2022 13:25) (95% - 100%)    REVIEW OF SYSTEMS:  RESPIRATORY: No cough, SOB  CARDIOVASCULAR: No chest pain, palpitations  GASTROINTESTINAL: No abdominal pain  NEUROLOGICAL: No HA  MUSCULOSKELETAL: +ve left arm pain and hand pain     PHYSICAL EXAM:  GENERAL: NAD  NECK: Supple, No JVD  CHEST/LUNG: Clear to auscultation bilaterally; No rales, rhonchi, wheezing, or rubs  HEART: S1, S2, Regular rate and rhythm  ABDOMEN: Soft, Nontender, Nondistended; Bowel sounds present  NEURO: Alert & Oriented X3,   EXTREMITIES: 2+edema left arm, +ve wrist tenderness, +ve left radial pulse, intact sensation 2/5 LUE strength, b/l LE with 2+ edema, left upper arm with dsg and brace    LABS:                        8.6    7.26  )-----------( 390      ( 29 Jan 2022 04:30 )             25.7     01-29    137  |  106  |  34<H>  ----------------------------<  93  4.0   |  25  |  0.63    Ca    8.0<L>      29 Jan 2022 04:30  Phos  3.1     01-28  Mg     2.0     01-28    TPro  5.5<L>  /  Alb  2.0<L>  /  TBili  1.2  /  DBili  x   /  AST  30  /  ALT  20  /  AlkPhos  120  01-28      A/P: 89 y/o F with PMH of HTN, CODP, who presented from NH due to worsening pain and difficulty moving fingers of left arm.         Left humeral fracture:   -ongoing left arm pain in setting of severely displaced and comminuted fracture of the mid to proximal humerus   -cont left UE brace   -add Percocet 1 tab for moderate pain, percocet 2 tabs for severe pain   -NWB to LUE       FOLLOW UP / RESULT:   Monitor effectiveness of above interventions

## 2022-01-29 NOTE — PATIENT PROFILE ADULT - VISION (WITH CORRECTIVE LENSES IF THE PATIENT USUALLY WEARS THEM):
READING/Partially impaired: cannot see medication labels or newsprint, but can see obstacles in path, and the surrounding layout; can count fingers at arm's length

## 2022-01-29 NOTE — PHYSICAL THERAPY INITIAL EVALUATION ADULT - PERTINENT HX OF CURRENT PROBLEM, REHAB EVAL
Patient is a 89 y/o F with PMH of HTN, CODP, who was admitted due to worsening pain and difficulty moving fingers of left arm.

## 2022-01-29 NOTE — PATIENT PROFILE ADULT - DO YOU FEEL THREATENED BY OTHERS?
no Chonodrocutaneous Helical Advancement Flap Text: The defect edges were debeveled with a #15 scalpel blade.  Given the location of the defect and the proximity to free margins a chondrocutaneous helical advancement flap was deemed most appropriate.  Using a sterile surgical marker, the appropriate advancement flap was drawn incorporating the defect and placing the expected incisions within the relaxed skin tension lines where possible.    The area thus outlined was incised deep to adipose tissue with a #15 scalpel blade.  The skin margins were undermined to an appropriate distance in all directions utilizing iris scissors.

## 2022-01-29 NOTE — PHYSICAL THERAPY INITIAL EVALUATION ADULT - DIAGNOSIS, PT EVAL
Pt presents with impairments in range of motion and muscle strength affecting her ability to perform bed mobility, transfers and ambulation.

## 2022-01-30 LAB
ANION GAP SERPL CALC-SCNC: 3 MMOL/L — LOW (ref 5–17)
BUN SERPL-MCNC: 30 MG/DL — HIGH (ref 7–18)
CALCIUM SERPL-MCNC: 8.3 MG/DL — LOW (ref 8.4–10.5)
CHLORIDE SERPL-SCNC: 107 MMOL/L — SIGNIFICANT CHANGE UP (ref 96–108)
CO2 SERPL-SCNC: 27 MMOL/L — SIGNIFICANT CHANGE UP (ref 22–31)
CREAT SERPL-MCNC: 0.63 MG/DL — SIGNIFICANT CHANGE UP (ref 0.5–1.3)
GLUCOSE SERPL-MCNC: 90 MG/DL — SIGNIFICANT CHANGE UP (ref 70–99)
HCT VFR BLD CALC: 27.8 % — LOW (ref 34.5–45)
HGB BLD-MCNC: 9.2 G/DL — LOW (ref 11.5–15.5)
MCHC RBC-ENTMCNC: 31.8 PG — SIGNIFICANT CHANGE UP (ref 27–34)
MCHC RBC-ENTMCNC: 33.1 GM/DL — SIGNIFICANT CHANGE UP (ref 32–36)
MCV RBC AUTO: 96.2 FL — SIGNIFICANT CHANGE UP (ref 80–100)
NRBC # BLD: 0 /100 WBCS — SIGNIFICANT CHANGE UP (ref 0–0)
PLATELET # BLD AUTO: 412 K/UL — HIGH (ref 150–400)
POTASSIUM SERPL-MCNC: 4.4 MMOL/L — SIGNIFICANT CHANGE UP (ref 3.5–5.3)
POTASSIUM SERPL-SCNC: 4.4 MMOL/L — SIGNIFICANT CHANGE UP (ref 3.5–5.3)
RBC # BLD: 2.89 M/UL — LOW (ref 3.8–5.2)
RBC # FLD: 16.6 % — HIGH (ref 10.3–14.5)
SODIUM SERPL-SCNC: 137 MMOL/L — SIGNIFICANT CHANGE UP (ref 135–145)
WBC # BLD: 7.02 K/UL — SIGNIFICANT CHANGE UP (ref 3.8–10.5)
WBC # FLD AUTO: 7.02 K/UL — SIGNIFICANT CHANGE UP (ref 3.8–10.5)

## 2022-01-30 RX ADMIN — GABAPENTIN 100 MILLIGRAM(S): 400 CAPSULE ORAL at 21:13

## 2022-01-30 RX ADMIN — LIDOCAINE 1 PATCH: 4 CREAM TOPICAL at 19:20

## 2022-01-30 RX ADMIN — PANTOPRAZOLE SODIUM 40 MILLIGRAM(S): 20 TABLET, DELAYED RELEASE ORAL at 05:38

## 2022-01-30 RX ADMIN — GABAPENTIN 100 MILLIGRAM(S): 400 CAPSULE ORAL at 15:11

## 2022-01-30 RX ADMIN — LIDOCAINE 1 PATCH: 4 CREAM TOPICAL at 23:28

## 2022-01-30 RX ADMIN — TAMSULOSIN HYDROCHLORIDE 0.4 MILLIGRAM(S): 0.4 CAPSULE ORAL at 21:13

## 2022-01-30 RX ADMIN — OXYCODONE AND ACETAMINOPHEN 2 TABLET(S): 5; 325 TABLET ORAL at 19:20

## 2022-01-30 RX ADMIN — OXYCODONE AND ACETAMINOPHEN 2 TABLET(S): 5; 325 TABLET ORAL at 18:05

## 2022-01-30 RX ADMIN — Medication 10 MILLIGRAM(S): at 12:00

## 2022-01-30 RX ADMIN — POLYETHYLENE GLYCOL 3350 17 GRAM(S): 17 POWDER, FOR SOLUTION ORAL at 11:54

## 2022-01-30 RX ADMIN — OXYCODONE AND ACETAMINOPHEN 2 TABLET(S): 5; 325 TABLET ORAL at 02:18

## 2022-01-30 RX ADMIN — GABAPENTIN 100 MILLIGRAM(S): 400 CAPSULE ORAL at 05:38

## 2022-01-30 RX ADMIN — LIDOCAINE 1 PATCH: 4 CREAM TOPICAL at 11:38

## 2022-01-30 RX ADMIN — OXYCODONE AND ACETAMINOPHEN 2 TABLET(S): 5; 325 TABLET ORAL at 03:18

## 2022-01-30 NOTE — PROGRESS NOTE ADULT - PROBLEM SELECTOR PLAN 2
-borderline BP, cont to hold HCTZ   -cont Losartan at lowered dose with parameters   -mon BP

## 2022-01-30 NOTE — PROGRESS NOTE ADULT - TIME BILLING
- Review of records, telemetry, vital signs and daily labs.   - General and cardiovascular physical examination.  - Generation of cardiovascular treatment plan.  - Coordination of care.      Patient was seen and examined by me on 1/30/2022  ,interim events noted,labs and radiology studies reviewed.  Pedro House MD,FACC.  [address] [place], [place] [id number].  [phone]
- Review of records, telemetry, vital signs and daily labs.   - General and cardiovascular physical examination.  - Generation of cardiovascular treatment plan.  - Coordination of care.      Patient was seen and examined by me on 1/29/22,interim events noted,labs and radiology studies reviewed.  Pedro House MD,FACC.  [address] [place], [place] [id number].  [phone]
- Review of records, telemetry, vital signs and daily labs.   - General and cardiovascular physical examination.  - Generation of cardiovascular treatment plan.  - Coordination of care.      Patient was seen and examined by me on 1/28/2022,interim events noted,labs and radiology studies reviewed.  Pedro House MD,FACC.  [address] [place], [place] [id number].  [phone]

## 2022-01-30 NOTE — PROGRESS NOTE ADULT - PROBLEM SELECTOR PLAN 6
-discharge disposition likely back to [facility] pending clinical course and improvement, pending CT LUE  -Spoke to pt's daughter Abbey this AM, updated on clinical status, treatment plan/options, all questions answered

## 2022-01-30 NOTE — PROGRESS NOTE ADULT - SUBJECTIVE AND OBJECTIVE BOX
DATE OF SERVICE:  1/30/2022  Patient was seen and examined on 1/30/2022    .Interim events noted.Consultant notes ,Labs,Telemetry reviewed by me    PRESENTING CC: L humeral fracture    HPI and HOSPITAL COURSE: HPI:  Patient is a 89 y/o F with PMH of HTN, CODP, who presented from NH due to worsening pain and difficulty moving fingers of left arm. Patient had a fall 2 weeks ago where she sustained a fracture of the left humerus. She did not receive any surgical interventions at that time, she was treated conservatively in the hospital for 2 weeks and was discharge don 1/24/22 to NH. At the nursing home patient developed worsening pain and was having difficulty moving her fingers. She was evaluated by a vascular NP who recommended admission to hospital for further eval. Patient complains of severe pain, specially when touching or moving. On my exam, vasculature, motor and sensation was intact at the distal aspect. She is unable to move at the elbow joint, or shoulder joint due to severe pain. The left arm is swollen and edematous.     Xrays in ED showing Left humeral fracture in 3 pieces, left wrist xray with possible ulnar dislocation?, Official reads pending. Ortho evaluated patient in ed, recommended, outpatient follow up. (27 Jan 2022 20:19)      INTERIM EVENTS:Patient seen at bedside interim events noted.      PMH -reviewed admission note, no change since admission  Heart Failure: Acute [ ] Chronic [ ] Acute on Chronic [ ] Diastolic [ ] Systolic [ ] Combined Systolic and Diastolic[ ]  KEYANA[ ]  ATN[ ]  CKD I [ ] CKDII [ ] CKD III [ ] CKD IV [ ] CKD V [ ] ESRD[ ]  HTN[ ] CVA[ ] DM[ ] COPD[ ] COVID[ ] AF[ ]  PPM[ ] ICD[ ]    MEDICATIONS  (STANDING):  bisacodyl Suppository 10 milliGRAM(s) Rectal daily  gabapentin 100 milliGRAM(s) Oral three times a day  lidocaine   4% Patch 1 Patch Transdermal daily  pantoprazole    Tablet 40 milliGRAM(s) Oral before breakfast  polyethylene glycol 3350 17 Gram(s) Oral daily  senna 2 Tablet(s) Oral at bedtime  sodium chloride 0.9%. 1000 milliLiter(s) (75 mL/Hr) IV Continuous <Continuous>  tamsulosin 0.4 milliGRAM(s) Oral at bedtime    MEDICATIONS  (PRN):  acetaminophen     Tablet .. 650 milliGRAM(s) Oral every 6 hours PRN Mild Pain (1 - 3)  ALBUTerol    90 MICROgram(s) HFA Inhaler 2 Puff(s) Inhalation every 6 hours PRN Shortness of Breath and/or Wheezing  oxycodone    5 mG/acetaminophen 325 mG 1 Tablet(s) Oral every 6 hours PRN Moderate Pain (4 - 6)  oxycodone    5 mG/acetaminophen 325 mG 2 Tablet(s) Oral every 6 hours PRN Severe Pain (7 - 10)    REVIEW OF SYSTEMS:  Constitutional: [ ] fever, [ ]weight loss,  [ x]fatigue  Eyes: [ ] visual changes  Respiratory: [ ]shortness of breath;  [ ] cough, [ ]wheezing, [ ]chills, [ ]hemoptysis  Cardiovascular: [ ] chest pain, [ ]palpitations, [ ]dizziness,  [ ]leg swelling[ ]orthopnea[ ]PND  Gastrointestinal: [ ] abdominal pain, [ ]nausea, [ ]vomiting,  [ ]diarrhea [ ]Constipation [ ]Melena  Genitourinary: [ ] dysuria, [ ] hematuria [ ]Palacios  Neurologic: [ ] headaches [ ] tremors[ ]weakness [ ]Paralysis Right[ ] Left[ ]  Skin: [ ] itching, [ ]burning, [ ] rashes  Endocrine: [ ] heat or cold intolerance  Musculoskeletal: [ ] joint pain or swelling; [ ] muscle, back, or extremity pain  Psychiatric: [ ] depression, [ ]anxiety, [ ]mood swings, or [ ]difficulty sleeping  Hematologic: [ ] easy bruising, [ ] bleeding gums    [ ] All remaining systems negative except as per above.   [ ]Unable to obtain.  [x] No change in ROS since admission      Vital Signs Last 24 Hrs  T(C): 37 (30 Jan 2022 14:02), Max: 37 (30 Jan 2022 14:02)  T(F): 98.6 (30 Jan 2022 14:02), Max: 98.6 (30 Jan 2022 14:02)  HR: 64 (30 Jan 2022 14:02) (64 - 74)  BP: 115/50 (30 Jan 2022 14:02) (115/50 - 125/48)  BP(mean): 68 (29 Jan 2022 20:41) (68 - 68)  RR: 17 (30 Jan 2022 14:02) (17 - 18)  SpO2: 95% (30 Jan 2022 14:02) (94% - 100%)  I&O's Summary    29 Jan 2022 07:01  -  30 Jan 2022 07:00  --------------------------------------------------------  IN: 0 mL / OUT: 250 mL / NET: -250 mL        PHYSICAL EXAM:  General: No acute distress BMI-  HEENT: EOMI, PERRL  Neck: Supple, [ ] JVD  Lungs: Equal air entry bilaterally; [ ] rales [ ] wheezing [ ] rhonchi  Heart: Regular rate and rhythm; [ ] murmur   /6 [ ] systolic [ ] diastolic [ ] radiation[ ] rubs [ ]  gallops  Abdomen: Nontender, bowel sounds present  Extremities: No clubbing, cyanosis, [ ] edema [ ]Pulses  equal and intact  Nervous system:  Alert & Oriented X3, no focal deficits  Psychiatric: Normal affect  Skin: No rashes or lesions    LABS:  01-30    137  |  107  |  30<H>  ----------------------------<  90  4.4   |  27  |  0.63    Ca    8.3<L>      30 Jan 2022 06:59      Creatinine Trend: 0.63<--, 0.63<--, 0.67<--, 0.82<--                        9.2    7.02  )-----------( 412      ( 30 Jan 2022 06:59 )             27.8         Cardiac Enzymes:

## 2022-01-31 ENCOUNTER — TRANSCRIPTION ENCOUNTER (OUTPATIENT)
Age: 87
End: 2022-01-31

## 2022-01-31 LAB
ALBUMIN SERPL ELPH-MCNC: 2.1 G/DL — LOW (ref 3.5–5)
ALBUMIN SERPL ELPH-MCNC: 2.3 G/DL — LOW (ref 3.5–5)
ALP SERPL-CCNC: 139 U/L — HIGH (ref 40–120)
ALP SERPL-CCNC: 143 U/L — HIGH (ref 40–120)
ALT FLD-CCNC: 21 U/L DA — SIGNIFICANT CHANGE UP (ref 10–60)
ALT FLD-CCNC: 23 U/L DA — SIGNIFICANT CHANGE UP (ref 10–60)
ANION GAP SERPL CALC-SCNC: 5 MMOL/L — SIGNIFICANT CHANGE UP (ref 5–17)
ANION GAP SERPL CALC-SCNC: 6 MMOL/L — SIGNIFICANT CHANGE UP (ref 5–17)
AST SERPL-CCNC: 36 U/L — SIGNIFICANT CHANGE UP (ref 10–40)
AST SERPL-CCNC: 36 U/L — SIGNIFICANT CHANGE UP (ref 10–40)
BASOPHILS # BLD AUTO: 0.04 K/UL — SIGNIFICANT CHANGE UP (ref 0–0.2)
BASOPHILS NFR BLD AUTO: 0.7 % — SIGNIFICANT CHANGE UP (ref 0–2)
BILIRUB SERPL-MCNC: 1.2 MG/DL — SIGNIFICANT CHANGE UP (ref 0.2–1.2)
BILIRUB SERPL-MCNC: 1.2 MG/DL — SIGNIFICANT CHANGE UP (ref 0.2–1.2)
BUN SERPL-MCNC: 24 MG/DL — HIGH (ref 7–18)
BUN SERPL-MCNC: 25 MG/DL — HIGH (ref 7–18)
CALCIUM SERPL-MCNC: 8.4 MG/DL — SIGNIFICANT CHANGE UP (ref 8.4–10.5)
CALCIUM SERPL-MCNC: 8.4 MG/DL — SIGNIFICANT CHANGE UP (ref 8.4–10.5)
CHLORIDE SERPL-SCNC: 106 MMOL/L — SIGNIFICANT CHANGE UP (ref 96–108)
CHLORIDE SERPL-SCNC: 107 MMOL/L — SIGNIFICANT CHANGE UP (ref 96–108)
CO2 SERPL-SCNC: 25 MMOL/L — SIGNIFICANT CHANGE UP (ref 22–31)
CO2 SERPL-SCNC: 26 MMOL/L — SIGNIFICANT CHANGE UP (ref 22–31)
CREAT SERPL-MCNC: 0.57 MG/DL — SIGNIFICANT CHANGE UP (ref 0.5–1.3)
CREAT SERPL-MCNC: 0.78 MG/DL — SIGNIFICANT CHANGE UP (ref 0.5–1.3)
EOSINOPHIL # BLD AUTO: 0.26 K/UL — SIGNIFICANT CHANGE UP (ref 0–0.5)
EOSINOPHIL NFR BLD AUTO: 4.6 % — SIGNIFICANT CHANGE UP (ref 0–6)
GLUCOSE SERPL-MCNC: 111 MG/DL — HIGH (ref 70–99)
GLUCOSE SERPL-MCNC: 91 MG/DL — SIGNIFICANT CHANGE UP (ref 70–99)
HCT VFR BLD CALC: 27.6 % — LOW (ref 34.5–45)
HGB BLD-MCNC: 9.2 G/DL — LOW (ref 11.5–15.5)
IMM GRANULOCYTES NFR BLD AUTO: 0.7 % — SIGNIFICANT CHANGE UP (ref 0–1.5)
LYMPHOCYTES # BLD AUTO: 1.7 K/UL — SIGNIFICANT CHANGE UP (ref 1–3.3)
LYMPHOCYTES # BLD AUTO: 29.9 % — SIGNIFICANT CHANGE UP (ref 13–44)
MAGNESIUM SERPL-MCNC: 1.9 MG/DL — SIGNIFICANT CHANGE UP (ref 1.6–2.6)
MAGNESIUM SERPL-MCNC: 8.7 MG/DL — CRITICAL HIGH (ref 1.6–2.6)
MCHC RBC-ENTMCNC: 31.9 PG — SIGNIFICANT CHANGE UP (ref 27–34)
MCHC RBC-ENTMCNC: 33.3 GM/DL — SIGNIFICANT CHANGE UP (ref 32–36)
MCV RBC AUTO: 95.8 FL — SIGNIFICANT CHANGE UP (ref 80–100)
MONOCYTES # BLD AUTO: 0.68 K/UL — SIGNIFICANT CHANGE UP (ref 0–0.9)
MONOCYTES NFR BLD AUTO: 12 % — SIGNIFICANT CHANGE UP (ref 2–14)
NEUTROPHILS # BLD AUTO: 2.96 K/UL — SIGNIFICANT CHANGE UP (ref 1.8–7.4)
NEUTROPHILS NFR BLD AUTO: 52.1 % — SIGNIFICANT CHANGE UP (ref 43–77)
NRBC # BLD: 0 /100 WBCS — SIGNIFICANT CHANGE UP (ref 0–0)
PHOSPHATE SERPL-MCNC: 3.1 MG/DL — SIGNIFICANT CHANGE UP (ref 2.5–4.5)
PHOSPHATE SERPL-MCNC: 3.4 MG/DL — SIGNIFICANT CHANGE UP (ref 2.5–4.5)
PLATELET # BLD AUTO: 437 K/UL — HIGH (ref 150–400)
POTASSIUM SERPL-MCNC: 4 MMOL/L — SIGNIFICANT CHANGE UP (ref 3.5–5.3)
POTASSIUM SERPL-MCNC: 4.4 MMOL/L — SIGNIFICANT CHANGE UP (ref 3.5–5.3)
POTASSIUM SERPL-SCNC: 4 MMOL/L — SIGNIFICANT CHANGE UP (ref 3.5–5.3)
POTASSIUM SERPL-SCNC: 4.4 MMOL/L — SIGNIFICANT CHANGE UP (ref 3.5–5.3)
PROT SERPL-MCNC: 5.8 G/DL — LOW (ref 6–8.3)
PROT SERPL-MCNC: 6.2 G/DL — SIGNIFICANT CHANGE UP (ref 6–8.3)
RBC # BLD: 2.88 M/UL — LOW (ref 3.8–5.2)
RBC # FLD: 16 % — HIGH (ref 10.3–14.5)
SARS-COV-2 RNA SPEC QL NAA+PROBE: SIGNIFICANT CHANGE UP
SODIUM SERPL-SCNC: 137 MMOL/L — SIGNIFICANT CHANGE UP (ref 135–145)
SODIUM SERPL-SCNC: 138 MMOL/L — SIGNIFICANT CHANGE UP (ref 135–145)
WBC # BLD: 5.68 K/UL — SIGNIFICANT CHANGE UP (ref 3.8–10.5)
WBC # FLD AUTO: 5.68 K/UL — SIGNIFICANT CHANGE UP (ref 3.8–10.5)

## 2022-01-31 PROCEDURE — 99232 SBSQ HOSP IP/OBS MODERATE 35: CPT

## 2022-01-31 RX ORDER — SENNA PLUS 8.6 MG/1
1 TABLET ORAL
Qty: 0 | Refills: 0 | DISCHARGE

## 2022-01-31 RX ORDER — SENNA PLUS 8.6 MG/1
2 TABLET ORAL
Qty: 0 | Refills: 0 | DISCHARGE
Start: 2022-01-31

## 2022-01-31 RX ORDER — OXYCODONE HYDROCHLORIDE 5 MG/1
5 TABLET ORAL EVERY 4 HOURS
Refills: 0 | Status: DISCONTINUED | OUTPATIENT
Start: 2022-01-31 | End: 2022-02-01

## 2022-01-31 RX ORDER — PANTOPRAZOLE SODIUM 20 MG/1
1 TABLET, DELAYED RELEASE ORAL
Qty: 0 | Refills: 0 | DISCHARGE
Start: 2022-01-31

## 2022-01-31 RX ORDER — TAMSULOSIN HYDROCHLORIDE 0.4 MG/1
1 CAPSULE ORAL
Qty: 0 | Refills: 0 | DISCHARGE

## 2022-01-31 RX ORDER — GABAPENTIN 400 MG/1
1 CAPSULE ORAL
Qty: 0 | Refills: 0 | DISCHARGE

## 2022-01-31 RX ORDER — TAMSULOSIN HYDROCHLORIDE 0.4 MG/1
1 CAPSULE ORAL
Qty: 0 | Refills: 0 | DISCHARGE
Start: 2022-01-31

## 2022-01-31 RX ORDER — CALCIUM GLUCONATE 100 MG/ML
1 VIAL (ML) INTRAVENOUS ONCE
Refills: 0 | Status: DISCONTINUED | OUTPATIENT
Start: 2022-01-31 | End: 2022-01-31

## 2022-01-31 RX ORDER — GABAPENTIN 400 MG/1
200 CAPSULE ORAL EVERY 12 HOURS
Refills: 0 | Status: DISCONTINUED | OUTPATIENT
Start: 2022-01-31 | End: 2022-02-01

## 2022-01-31 RX ORDER — LIDOCAINE 4 G/100G
1 CREAM TOPICAL
Qty: 0 | Refills: 0 | DISCHARGE

## 2022-01-31 RX ORDER — GABAPENTIN 400 MG/1
1 CAPSULE ORAL
Qty: 0 | Refills: 0 | DISCHARGE
Start: 2022-01-31

## 2022-01-31 RX ORDER — POLYETHYLENE GLYCOL 3350 17 G/17G
17 POWDER, FOR SOLUTION ORAL
Qty: 0 | Refills: 0 | DISCHARGE
Start: 2022-01-31

## 2022-01-31 RX ORDER — LIDOCAINE 4 G/100G
0 CREAM TOPICAL
Qty: 0 | Refills: 0 | DISCHARGE

## 2022-01-31 RX ORDER — ACETAMINOPHEN 500 MG
1000 TABLET ORAL EVERY 8 HOURS
Refills: 0 | Status: DISCONTINUED | OUTPATIENT
Start: 2022-01-31 | End: 2022-02-01

## 2022-01-31 RX ADMIN — OXYCODONE AND ACETAMINOPHEN 1 TABLET(S): 5; 325 TABLET ORAL at 06:57

## 2022-01-31 RX ADMIN — Medication 1000 MILLIGRAM(S): at 16:40

## 2022-01-31 RX ADMIN — Medication 1000 MILLIGRAM(S): at 21:44

## 2022-01-31 RX ADMIN — LIDOCAINE 1 PATCH: 4 CREAM TOPICAL at 11:55

## 2022-01-31 RX ADMIN — TAMSULOSIN HYDROCHLORIDE 0.4 MILLIGRAM(S): 0.4 CAPSULE ORAL at 21:45

## 2022-01-31 RX ADMIN — GABAPENTIN 100 MILLIGRAM(S): 400 CAPSULE ORAL at 13:34

## 2022-01-31 RX ADMIN — LIDOCAINE 1 PATCH: 4 CREAM TOPICAL at 19:00

## 2022-01-31 RX ADMIN — Medication 1000 MILLIGRAM(S): at 22:54

## 2022-01-31 RX ADMIN — POLYETHYLENE GLYCOL 3350 17 GRAM(S): 17 POWDER, FOR SOLUTION ORAL at 13:34

## 2022-01-31 RX ADMIN — PANTOPRAZOLE SODIUM 40 MILLIGRAM(S): 20 TABLET, DELAYED RELEASE ORAL at 05:46

## 2022-01-31 RX ADMIN — GABAPENTIN 100 MILLIGRAM(S): 400 CAPSULE ORAL at 05:45

## 2022-01-31 RX ADMIN — OXYCODONE AND ACETAMINOPHEN 1 TABLET(S): 5; 325 TABLET ORAL at 05:45

## 2022-01-31 RX ADMIN — OXYCODONE AND ACETAMINOPHEN 2 TABLET(S): 5; 325 TABLET ORAL at 02:18

## 2022-01-31 RX ADMIN — OXYCODONE HYDROCHLORIDE 5 MILLIGRAM(S): 5 TABLET ORAL at 22:54

## 2022-01-31 RX ADMIN — OXYCODONE HYDROCHLORIDE 5 MILLIGRAM(S): 5 TABLET ORAL at 21:44

## 2022-01-31 RX ADMIN — OXYCODONE AND ACETAMINOPHEN 2 TABLET(S): 5; 325 TABLET ORAL at 03:24

## 2022-01-31 RX ADMIN — SENNA PLUS 2 TABLET(S): 8.6 TABLET ORAL at 21:45

## 2022-01-31 RX ADMIN — Medication 1000 MILLIGRAM(S): at 15:40

## 2022-01-31 NOTE — DISCHARGE NOTE PROVIDER - PROVIDER TOKENS
PROVIDER:[TOKEN:[[id number]],FOLLOWUP:[1 week],ESTABLISHEDPATIENT:[T]],FREE:[LAST:[Andrew],FIRST:[Watson],PHONE:[phone]],FAX:[(   )    -],ADDRESS:[[address] [address]]]

## 2022-01-31 NOTE — PROGRESS NOTE ADULT - ASSESSMENT
89 y/o F with PMH of HTN, CODP, who presented from [facility] due to worsening pain and difficulty moving fingers of left arm. Pt was recently admitted to another facility after a fall where she sustained a fracture of the left humerus, pt was treated with conservative measures and d/c'd to [facility] on 1/24.   Pt was admitted for worsening left arm pain and swelling, evaluated by Ortho on admission and recommended for outpt follow up   Pending left arm CT today 
information as submitted by the pharmacies.    This report was requested by: Kavon Carbajal | Reference #: [id number]    Others' Prescriptions  Patient Name: Kenna SwanBirth Date: 11/13/1928  Address: [place], [place] [address]Rmi: Female  Rx Written	Rx Dispensed	Drug	Quantity	Days Supply	Prescriber Name	Prescriber Shira #	Payment Method	Dispenser  01/24/2022	01/24/2022	oxycodone hcl (ir) 5 mg tablet	30	7	Lashay Riggs	[id number]	Other	[id number]  * - Drugs marked with an asterisk are compound drugs. If the compound drug is made up of more than one controlled substance, then each controlled substance will be a separate row in the table.
89 y/o F with PMH of HTN, CODP, who presented from [facility] due to worsening pain and difficulty moving fingers of left arm. Pt was recently admitted to another facility after a fall where she sustained a fracture of the left humerus, pt was treated with conservative measures and d/c'd to [facility] on 1/24.   Pt was admitted for worsening left arm pain and swelling, evaluated by Ortho on admission and recommended for outpt follow up   Pending left arm CT today 
89 y/o F with PMH of HTN, CODP, who presented from [facility] due to worsening pain and difficulty moving fingers of left arm. Pt was recently admitted to another facility after a fall where she sustained a fracture of the left humerus, pt was treated with conservative measures and d/c'd to [facility] on 1/24.   Pt was admitted for worsening left arm pain and swelling, evaluated by Ortho on admission and recommended for outpt follow up   Pending left arm CT today

## 2022-01-31 NOTE — PROGRESS NOTE ADULT - PROBLEM SELECTOR PLAN 1
-p/w worsening left arm pain and swelling, s/p fall 2 weeks ago with left humeral fracture, treated with conservative measures at [facility]   -Xray of left wrist with no acute fracture   -cont left UE brace   -NWB to LUE   -cont pain mgmt   -PT consult   -f/u CT left arm   -Ortho following
·  Recommendation: Pt with left humeral fracture.  + left arm pain.  Pain with movement.  + left wrist pain.  + radial pain - fracture healed.  No surgical intervention by primary team. Pt to follow up with ortho surgeon as oupt at [place]   High risk medications reviewed. Avoid polypharmacy. Avoid IV opioids. Avoid NSAIDs and benzodiazepines. Non-pharmacological sleep aides initiated. Non-opioid medications and non-pharmacological pain management measures initiated.  Mild pain - pain level 1-3  nonpharmacological measures  ice packs, heat packs, PT/OOB, guided imagery, distraction   Nonopioid recc  - Acetaminophen 1 gram po q 8 hours atc for 3 days   - increase gabapentin to 200mg po q 8 hours  - Lidocaine 4% daily   Opioid Recc  - oxycodone 5mg po q 4 hours prn moderate pain   Bowel Regimen  - senna  PT   Martínez brace adjusted by ortho
-p/w worsening left arm pain and swelling, s/p fall 2 weeks ago with left humeral fracture, treated with conservative measures at [facility]   -Xray of left wrist with no acute fracture   -cont left UE brace   -NWB to LUE   -cont pain mgmt   -PT consult   -f/u CT left arm   -Ortho following
-p/w worsening left arm pain and swelling, s/p fall 2 weeks ago with left humeral fracture, treated with conservative measures at [facility]   -Xray of left wrist with no acute fracture   -cont left UE brace   -NWB to LUE   -cont pain mgmt   -PT consult   -f/u CT left arm   -Ortho following

## 2022-01-31 NOTE — PROGRESS NOTE ADULT - REASON FOR ADMISSION
left humeral fracture, worsening pain

## 2022-01-31 NOTE — DISCHARGE NOTE PROVIDER - NSDCCPCAREPLAN_GEN_ALL_CORE_FT
PRINCIPAL DISCHARGE DIAGNOSIS  Diagnosis: Left humeral fracture  Assessment and Plan of Treatment: You were admitted for left humeral fracture and were seen by ortho team. Brace was applied to left arm , no surgical intervention at this time.  Follow up with ortho after discharge.      SECONDARY DISCHARGE DIAGNOSES  Diagnosis: COPD (chronic obstructive pulmonary disease)  Assessment and Plan of Treatment: Chronic Obstructive Pulmonary Disease  Chronic obstructive pulmonary disease (COPD) is a lung condition in which airflow from the lungs is limited. Causes include smoking, secondhand smoke exposure, genetics, or recurrent infections. Take all medicines (inhaled or pills) as directed by your health care provider. Avoid exposure to irritants such as smoke, chemicals, and fumes that aggravate your breathing.  If you are a smoker, the most important thing that you can do is stop smoking. Continuing to smoke will cause further lung damage and breathing trouble. Ask your health care provider for help with quitting smoking.  SEEK IMMEDIATE MEDICAL CARE IF YOU HAVE ANY OF THE FOLLOWING SYMPTOMS: shortness of breath at rest or when talking, bluish discoloration of lips, skin, fever, worsening cough, unexplained chest pain, or lightheadedness/dizziness.      Diagnosis: HTN (hypertension)  Assessment and Plan of Treatment: You have a history of hypertension.  Low salt diet  Activity as tolerated.  Take all medication as prescribed.  Follow up with your medical doctor for routine blood pressure monitoring at your next visit.  Notify your doctor if you have any of the following symptoms:   Dizziness, Lightheadedness, Blurry vision, Headache, Chest pain, Shortness of breath

## 2022-01-31 NOTE — DISCHARGE NOTE PROVIDER - HOSPITAL COURSE
89 y/o F with PMH of HTN, CODP, who presented from [facility] due to worsening pain and difficulty moving fingers of left arm. Pt was recently admitted to another facility after a fall where she sustained a fracture of the left humerus, pt was treated with conservative measures and d/c'd to [facility] on 1/24.   Pt was admitted for worsening left arm pain and swelling, evaluated by Ortho on admission and recommended for outpatient follow up. Left arm CT shows Severely displaced and comminuted fracture of the mid to proximal, humeral diaphysis is present with large hematoma, as per ortho conservative management with arm brace in place. Patient treated with 1U blood transfusion for anemia likely secondary to hematoma. Pt evaluated patient with recommendation for rehab after discharge. Patient evaluated by pain management team with recommendation for non-pharmacologic and pharmacologic treatment. Patient was prescribed acetaminophen, gabapentin and lidocaine patch and oxycodone as needed for severe pain.    Given patient's improved clinical status and current hemodynamic stability, decision was made to discharge.  Please refer to patient's complete medical chart with documents for a full hospital course, for this is only a brief summary.

## 2022-01-31 NOTE — PROGRESS NOTE ADULT - SUBJECTIVE AND OBJECTIVE BOX
Source of information: , Chart review  Patient language: English  : n/a    CC:      This is a 89yFemale patient who presents to the hospital for Patient is a 89y old  Female who presents with a chief complaint of left humeral fracture, worsening pain (30 Jan 2022 16:31)  .       Patient stated goal for pain control: to be able to take deep breaths, get out of bed to chair and ambulate with tolerable pain control.     PAIN SCORE:         SCALE USED: (1-10 NRS)      PAST MEDICAL & SURGICAL HISTORY:  Hypertension    Anxiety    Arthritis    COPD (chronic obstructive pulmonary disease)  on home O2        FAMILY HISTORY:        SOCIAL HISTORY:  [ ] Denies Smoking, Alcohol, or Drug Use    Allergies    No Known Allergies    Intolerances        MEDICATIONS:    MEDICATIONS  (STANDING):  acetaminophen     Tablet .. 1000 milliGRAM(s) Oral every 8 hours  gabapentin 100 milliGRAM(s) Oral three times a day  lidocaine   4% Patch 1 Patch Transdermal daily  pantoprazole    Tablet 40 milliGRAM(s) Oral before breakfast  polyethylene glycol 3350 17 Gram(s) Oral daily  senna 2 Tablet(s) Oral at bedtime  sodium chloride 0.9%. 1000 milliLiter(s) (75 mL/Hr) IV Continuous <Continuous>  tamsulosin 0.4 milliGRAM(s) Oral at bedtime    MEDICATIONS  (PRN):  ALBUTerol    90 MICROgram(s) HFA Inhaler 2 Puff(s) Inhalation every 6 hours PRN Shortness of Breath and/or Wheezing  oxyCODONE    IR 5 milliGRAM(s) Oral every 4 hours PRN Moderate Pain (4 - 6)      Vital Signs Last 24 Hrs  T(C): 36.6 (31 Jan 2022 05:05), Max: 37 (30 Jan 2022 14:02)  T(F): 97.8 (31 Jan 2022 05:05), Max: 98.6 (30 Jan 2022 14:02)  HR: 60 (31 Jan 2022 05:05) (60 - 74)  BP: 114/50 (31 Jan 2022 05:05) (108/55 - 117/60)  BP(mean): --  RR: 19 (31 Jan 2022 05:05) (17 - 19)  SpO2: 98% (31 Jan 2022 05:05) (94% - 98%)    LABS:                          9.2    5.68  )-----------( 437      ( 31 Jan 2022 07:24 )             27.6     01-31    138  |  107  |  25<H>  ----------------------------<  111<H>  4.4   |  26  |  0.78    Ca    8.4      31 Jan 2022 11:20  Phos  3.4     01-31  Mg     1.9     01-31    TPro  6.2  /  Alb  2.3<L>  /  TBili  1.2  /  DBili  x   /  AST  36  /  ALT  23  /  AlkPhos  143<H>  01-31      LIVER FUNCTIONS - ( 31 Jan 2022 11:20 )  Alb: 2.3 g/dL / Pro: 6.2 g/dL / ALK PHOS: 143 U/L / ALT: 23 U/L DA / AST: 36 U/L / GGT: x             CAPILLARY BLOOD GLUCOSE          Radiology:    Drug Screen:        REVIEW OF SYSTEMS:  CONSTITUTIONAL: No fever or fatigue  RESPIRATORY: No cough, wheezing, chills or hemoptysis; No shortness of breath  CARDIOVASCULAR: No chest pain, palpitations, dizziness, or leg swelling  GASTROINTESTINAL: No abdominal or epigastric pain. No nausea, vomiting; No diarrhea or constipation.   GENITOURINARY: No dysuria, frequency, hematuria, retention or incontinence  MUSCULOSKELETAL: No joint pain or swelling; No muscle, back, or extremity pain, no upper or lower motor strength weakness, no saddle anesthesia, bowel/bladder incontinence, no falls   NEURO: No headaches, No numbness/tingling b/l LE, No weakness  PSYCHIATRIC: No depression, anxiety, mood swings, or difficulty sleeping    PHYSICAL EXAM:  GENERAL:  Alert & Oriented X3, NAD, Good concentration  CHEST/LUNG: Clear to auscultation bilaterally; No rales, rhonchi, wheezing, or rubs  HEART: Regular rate and rhythm; No murmurs, rubs, or gallops  ABDOMEN: Soft, Nontender, Nondistended; Bowel sounds present  EXTREMITIES:  2+ Peripheral Pulses, No cyanosis, or edema  MUSCULOSKELETAL: + decreased rom Motor Strength 5/5 B/L upper and lower extremities; moves all extremities equally against gravity; ROM intact; negative SLR  SKIN: No rashes or lesions    Risk factors associated with adverse outcomes related to opioid treatment  [ ]  Concurrent benzodiazepine use  [ ]  History/ Active substance use or alcohol use disorder  [ ] Psychiatric co-morbidity  [ ] Sleep apnea  [ ] COPD  [ ] BMI> 35  [ ] Liver dysfunction  [ ] Renal dysfunction  [ ] CHF  [ ] Smoker  [ ]  Age > 60 years    [ ]  [place]  Reviewed and Copied to Chart. See below.    Plan of care and goal oriented pain management treatment options were discussed with patient and /or primary care giver; all questions and concerns were addressed and care was aligned with patient's wishes.    Educated patient on goal oriented pain management treatment options     01-31-22 @ 13:44 Source of information: , Chart review  Patient language: English  : n/a    CC:  left shoulder pain    This is a 89yFemale patient who presents to the hospital for Patient is a 89y old  Female who presents with a chief complaint of left humeral fracture, worsening pain (30 Jan 2022 16:31)    Pt s/p fall 2 weeks ago with humeral fracture and left radius facture  + left arm pain and left wrist pain.  ext in simpson brace. Brace adjusted by ortho.  No surgical intervention at this time.     PAIN SCORE:    4/10     SCALE USED: (1-10 NRS)      PAST MEDICAL & SURGICAL HISTORY:  Hypertension    Anxiety    Arthritis    COPD (chronic obstructive pulmonary disease)  on home O2    SOCIAL HISTORY:  [x] Denies Smoking, Alcohol, or Drug Use    Allergies    No Known Allergies    Intolerances        MEDICATIONS:    MEDICATIONS  (STANDING):  acetaminophen     Tablet .. 1000 milliGRAM(s) Oral every 8 hours  gabapentin 100 milliGRAM(s) Oral three times a day  lidocaine   4% Patch 1 Patch Transdermal daily  pantoprazole    Tablet 40 milliGRAM(s) Oral before breakfast  polyethylene glycol 3350 17 Gram(s) Oral daily  senna 2 Tablet(s) Oral at bedtime  sodium chloride 0.9%. 1000 milliLiter(s) (75 mL/Hr) IV Continuous <Continuous>  tamsulosin 0.4 milliGRAM(s) Oral at bedtime    MEDICATIONS  (PRN):  ALBUTerol    90 MICROgram(s) HFA Inhaler 2 Puff(s) Inhalation every 6 hours PRN Shortness of Breath and/or Wheezing  oxyCODONE    IR 5 milliGRAM(s) Oral every 4 hours PRN Moderate Pain (4 - 6)      Vital Signs Last 24 Hrs  T(C): 36.6 (31 Jan 2022 05:05), Max: 37 (30 Jan 2022 14:02)  T(F): 97.8 (31 Jan 2022 05:05), Max: 98.6 (30 Jan 2022 14:02)  HR: 60 (31 Jan 2022 05:05) (60 - 74)  BP: 114/50 (31 Jan 2022 05:05) (108/55 - 117/60)  BP(mean): --  RR: 19 (31 Jan 2022 05:05) (17 - 19)  SpO2: 98% (31 Jan 2022 05:05) (94% - 98%)    LABS:                          9.2    5.68  )-----------( 437      ( 31 Jan 2022 07:24 )             27.6     01-31    138  |  107  |  25<H>  ----------------------------<  111<H>  4.4   |  26  |  0.78    Ca    8.4      31 Jan 2022 11:20  Phos  3.4     01-31  Mg     1.9     01-31    TPro  6.2  /  Alb  2.3<L>  /  TBili  1.2  /  DBili  x   /  AST  36  /  ALT  23  /  AlkPhos  143<H>  01-31      LIVER FUNCTIONS - ( 31 Jan 2022 11:20 )  Alb: 2.3 g/dL / Pro: 6.2 g/dL / ALK PHOS: 143 U/L / ALT: 23 U/L DA / AST: 36 U/L / GGT: x             CAPILLARY BLOOD GLUCOSE          Radiology:    Drug Screen:        REVIEW OF SYSTEMS:  CONSTITUTIONAL: No fever or fatigue  RESPIRATORY: No cough, wheezing, chills or hemoptysis; No shortness of breath  CARDIOVASCULAR: No chest pain, palpitations, dizziness, or leg swelling  GASTROINTESTINAL: No abdominal or epigastric pain. No nausea, vomiting; No diarrhea or constipation.   GENITOURINARY: No dysuria, frequency, hematuria, retention or incontinence  MUSCULOSKELETAL: + left shoulder pain  + left arm pain   NEURO: No headaches, No numbness/tingling b/l LE, No weakness  PSYCHIATRIC: No depression, anxiety, mood swings, or difficulty sleeping    PHYSICAL EXAM:  GENERAL:  Alert & Oriented X2 + left arm pain  CHEST/LUNG: Clear to auscultation bilaterally; No rales, rhonchi, wheezing, or rubs  HEART: Regular rate and rhythm; No murmurs, rubs, or gallops  ABDOMEN: Soft, Nontender, Nondistended; Bowel sounds present  EXTREMITIES:  2+ Peripheral Pulses, No cyanosis, or edema  MUSCULOSKELETAL: + decreased rom + left arm tenderness + left wrist tenderness + edema of ext   SKIN: No rashes or lesions    Risk factors associated with adverse outcomes related to opioid treatment  [ ]  Concurrent benzodiazepine use  [ ]  History/ Active substance use or alcohol use disorder  [ ] Psychiatric co-morbidity  [ ] Sleep apnea  [ ] COPD  [ ] BMI> 35  [ ] Liver dysfunction  [ ] Renal dysfunction  [ ] CHF  [ ] Smoker  [ ]x  Age > 60 years    [ x]  [place]  Reviewed and Copied to Chart. See below.    Plan of care and goal oriented pain management treatment options were discussed with patient and /or primary care giver; all questions and concerns were addressed and care was aligned with patient's wishes.    Educated patient on goal oriented pain management treatment options     01-31-22 @ 13:44

## 2022-01-31 NOTE — DISCHARGE NOTE PROVIDER - NSDCMRMEDTOKEN_GEN_ALL_CORE_FT
acetaminophen 325 mg oral capsule: 1 cap(s) orally 3 times a day  bisacodyl 10 mg rectal suppository: 1 suppository(ies) rectal once a day  ferrous sulfate 325 mg (65 mg elemental iron) oral delayed release tablet: 1 tab(s) orally once a day  gabapentin 100 mg oral capsule: 1 cap(s) orally 3 times a day  hydrochlorothiazide-losartan 25 mg-100 mg oral tablet: 1 tab(s) orally once a day  lidocaine 4% patch: Apply topically to affected area once a day  Lovenox 30 mg/0.3 mL injectable solution:   oxyCODONE 5 mg oral tablet: 1 tab(s) orally every 6 hours, As Needed  pantoprazole 40 mg oral delayed release tablet: 1 tab(s) orally once a day (before a meal)  polyethylene glycol 3350 oral powder for reconstitution: 17 gram(s) orally once a day  senna oral tablet: 2 tab(s) orally once a day (at bedtime)  tamsulosin 0.4 mg oral capsule: 1 cap(s) orally once a day (at bedtime)

## 2022-01-31 NOTE — DISCHARGE NOTE PROVIDER - CARE PROVIDER_API CALL
[facility])  Cardiology  [address] [place] [address]  Phone: [phone]  Fax: [phone]  Established Patient  Follow Up Time: 1 week    Watson Morales  [address]  [place] [address]  Phone: [phone]  Fax: (   )    -  Follow Up Time:

## 2022-02-01 ENCOUNTER — TRANSCRIPTION ENCOUNTER (OUTPATIENT)
Age: 87
End: 2022-02-01

## 2022-02-01 VITALS
OXYGEN SATURATION: 98 % | RESPIRATION RATE: 18 BRPM | SYSTOLIC BLOOD PRESSURE: 132 MMHG | DIASTOLIC BLOOD PRESSURE: 59 MMHG | HEART RATE: 62 BPM | TEMPERATURE: 98 F

## 2022-02-01 PROCEDURE — 86850 RBC ANTIBODY SCREEN: CPT

## 2022-02-01 PROCEDURE — 94640 AIRWAY INHALATION TREATMENT: CPT

## 2022-02-01 PROCEDURE — 84100 ASSAY OF PHOSPHORUS: CPT

## 2022-02-01 PROCEDURE — 85027 COMPLETE CBC AUTOMATED: CPT

## 2022-02-01 PROCEDURE — 73201 CT UPPER EXTREMITY W/DYE: CPT

## 2022-02-01 PROCEDURE — 82550 ASSAY OF CK (CPK): CPT

## 2022-02-01 PROCEDURE — 86900 BLOOD TYPING SEROLOGIC ABO: CPT

## 2022-02-01 PROCEDURE — 73060 X-RAY EXAM OF HUMERUS: CPT

## 2022-02-01 PROCEDURE — 36430 TRANSFUSION BLD/BLD COMPNT: CPT

## 2022-02-01 PROCEDURE — 99285 EMERGENCY DEPT VISIT HI MDM: CPT | Mod: 25

## 2022-02-01 PROCEDURE — 86923 COMPATIBILITY TEST ELECTRIC: CPT

## 2022-02-01 PROCEDURE — 97110 THERAPEUTIC EXERCISES: CPT

## 2022-02-01 PROCEDURE — 73110 X-RAY EXAM OF WRIST: CPT

## 2022-02-01 PROCEDURE — 80048 BASIC METABOLIC PNL TOTAL CA: CPT

## 2022-02-01 PROCEDURE — 86901 BLOOD TYPING SEROLOGIC RH(D): CPT

## 2022-02-01 PROCEDURE — 36415 COLL VENOUS BLD VENIPUNCTURE: CPT

## 2022-02-01 PROCEDURE — P9040: CPT

## 2022-02-01 PROCEDURE — 97162 PT EVAL MOD COMPLEX 30 MIN: CPT

## 2022-02-01 PROCEDURE — 87635 SARS-COV-2 COVID-19 AMP PRB: CPT

## 2022-02-01 PROCEDURE — 80053 COMPREHEN METABOLIC PANEL: CPT

## 2022-02-01 PROCEDURE — 83735 ASSAY OF MAGNESIUM: CPT

## 2022-02-01 PROCEDURE — 85025 COMPLETE CBC W/AUTO DIFF WBC: CPT

## 2022-02-01 PROCEDURE — 96374 THER/PROPH/DIAG INJ IV PUSH: CPT

## 2022-02-01 PROCEDURE — 97530 THERAPEUTIC ACTIVITIES: CPT

## 2022-02-01 RX ADMIN — GABAPENTIN 200 MILLIGRAM(S): 400 CAPSULE ORAL at 00:49

## 2022-02-01 RX ADMIN — PANTOPRAZOLE SODIUM 40 MILLIGRAM(S): 20 TABLET, DELAYED RELEASE ORAL at 05:42

## 2022-02-01 RX ADMIN — GABAPENTIN 200 MILLIGRAM(S): 400 CAPSULE ORAL at 05:41

## 2022-02-01 RX ADMIN — Medication 1000 MILLIGRAM(S): at 05:41

## 2022-02-01 RX ADMIN — LIDOCAINE 1 PATCH: 4 CREAM TOPICAL at 12:07

## 2022-02-01 RX ADMIN — Medication 1000 MILLIGRAM(S): at 14:22

## 2022-02-01 RX ADMIN — Medication 1000 MILLIGRAM(S): at 13:22

## 2022-02-01 RX ADMIN — Medication 1000 MILLIGRAM(S): at 07:29

## 2022-02-01 RX ADMIN — POLYETHYLENE GLYCOL 3350 17 GRAM(S): 17 POWDER, FOR SOLUTION ORAL at 12:07

## 2022-02-01 RX ADMIN — OXYCODONE HYDROCHLORIDE 5 MILLIGRAM(S): 5 TABLET ORAL at 03:26

## 2022-02-01 RX ADMIN — LIDOCAINE 1 PATCH: 4 CREAM TOPICAL at 05:42

## 2022-02-01 RX ADMIN — OXYCODONE HYDROCHLORIDE 5 MILLIGRAM(S): 5 TABLET ORAL at 04:57

## 2022-02-01 NOTE — DISCHARGE NOTE NURSING/CASE MANAGEMENT/SOCIAL WORK - NSDCPEFALRISK_GEN_ALL_CORE
For information on Fall & Injury Prevention, visit: [email or web address] OR  [email or web address] OR  https://www.cdc.gov/steadi/patient.html

## 2022-02-01 NOTE — DISCHARGE NOTE NURSING/CASE MANAGEMENT/SOCIAL WORK - NSDCVIVACCINE_GEN_ALL_CORE_FT
influenza, injectable, quadrivalent, preservative free; 16-Oct-2014 11:08; Denise Monique (RN); [employer]; ([id number]; IntraMuscular; Deltoid Right.; 0.5 milliLiter(s);

## 2022-02-01 NOTE — DISCHARGE NOTE NURSING/CASE MANAGEMENT/SOCIAL WORK - PATIENT PORTAL LINK FT
You can access the FollowMyHealth Patient Portal offered by [facility] by registering at the following website: [email or web address]. By joining [facility]’s FollowMyHealth portal, you will also be able to view your health information using other applications (apps) compatible with our system.

## 2022-11-29 NOTE — PHYSICAL THERAPY INITIAL EVALUATION ADULT - ASSISTIVE DEVICE, REHAB EVAL
bed rails Dapsone Counseling: I discussed with the patient the risks of dapsone including but not limited to hemolytic anemia, agranulocytosis, rashes, methemoglobinemia, kidney failure, peripheral neuropathy, headaches, GI upset, and liver toxicity.  Patients who start dapsone require monitoring including baseline LFTs and weekly CBCs for the first month, then every month thereafter.  The patient verbalized understanding of the proper use and possible adverse effects of dapsone.  All of the patient's questions and concerns were addressed.